# Patient Record
Sex: FEMALE | Race: WHITE | NOT HISPANIC OR LATINO | Employment: OTHER | URBAN - METROPOLITAN AREA
[De-identification: names, ages, dates, MRNs, and addresses within clinical notes are randomized per-mention and may not be internally consistent; named-entity substitution may affect disease eponyms.]

---

## 2021-07-20 ENCOUNTER — TRANSFERRED RECORDS (OUTPATIENT)
Dept: HEALTH INFORMATION MANAGEMENT | Facility: CLINIC | Age: 66
End: 2021-07-20

## 2021-07-20 LAB — POTASSIUM (EXTERNAL): 4.3 MMOL/L (ref 3.5–5)

## 2021-08-09 DIAGNOSIS — Z11.59 ENCOUNTER FOR SCREENING FOR OTHER VIRAL DISEASES: ICD-10-CM

## 2021-08-10 ENCOUNTER — LAB (OUTPATIENT)
Dept: LAB | Facility: CLINIC | Age: 66
End: 2021-08-10
Attending: ORTHOPAEDIC SURGERY
Payer: COMMERCIAL

## 2021-08-10 DIAGNOSIS — Z11.59 ENCOUNTER FOR SCREENING FOR OTHER VIRAL DISEASES: ICD-10-CM

## 2021-08-10 PROCEDURE — U0005 INFEC AGEN DETEC AMPLI PROBE: HCPCS

## 2021-08-10 PROCEDURE — U0003 INFECTIOUS AGENT DETECTION BY NUCLEIC ACID (DNA OR RNA); SEVERE ACUTE RESPIRATORY SYNDROME CORONAVIRUS 2 (SARS-COV-2) (CORONAVIRUS DISEASE [COVID-19]), AMPLIFIED PROBE TECHNIQUE, MAKING USE OF HIGH THROUGHPUT TECHNOLOGIES AS DESCRIBED BY CMS-2020-01-R: HCPCS

## 2021-08-10 RX ORDER — CINNAMON BARK
500 POWDER (GRAM) MISCELLANEOUS DAILY
Status: ON HOLD | COMMUNITY
End: 2024-07-11

## 2021-08-10 RX ORDER — PRAVASTATIN SODIUM 40 MG
40 TABLET ORAL DAILY
COMMUNITY
End: 2024-01-23

## 2021-08-10 RX ORDER — LISINOPRIL 40 MG/1
40 TABLET ORAL AT BEDTIME
COMMUNITY

## 2021-08-10 RX ORDER — LOPERAMIDE HYDROCHLORIDE 2 MG/1
2 TABLET ORAL 2 TIMES DAILY
Status: ON HOLD | COMMUNITY
End: 2024-07-11 | Stop reason: DRUGHIGH

## 2021-08-10 RX ORDER — OXYCODONE HYDROCHLORIDE 5 MG/1
5-10 CAPSULE ORAL EVERY 8 HOURS PRN
Status: ON HOLD | COMMUNITY
End: 2021-08-13

## 2021-08-10 RX ORDER — METOPROLOL TARTRATE 50 MG
100 TABLET ORAL 2 TIMES DAILY
COMMUNITY

## 2021-08-10 RX ORDER — FUROSEMIDE 20 MG
20 TABLET ORAL DAILY
COMMUNITY

## 2021-08-10 RX ORDER — POTASSIUM CHLORIDE 750 MG/1
10 TABLET, EXTENDED RELEASE ORAL DAILY
COMMUNITY
End: 2024-01-23

## 2021-08-10 RX ORDER — MULTIVIT WITH MINERALS/LUTEIN
1 TABLET ORAL DAILY
COMMUNITY

## 2021-08-10 ASSESSMENT — MIFFLIN-ST. JEOR: SCORE: 1463.94

## 2021-08-10 NOTE — PHARMACY-ADMISSION MEDICATION HISTORY
Admission medication history interview status for this patient is complete. See [facility] admission navigator for allergy information, prior to admission medications and immunization status.     PTA meds completed by pre-admitting nurse Mary Ca and reviewed by pharmacy       Prior to Admission medications    Medication Sig Last Dose Taking? Auth Provider   calcium carbonate-vitamin D (OSCAL W/D) 500-200 MG-UNIT tablet Take 1 tablet by mouth daily 7/30/2021 Yes Reported, Patient   Cinnamon Bark POWD Take 500 mg by mouth daily  7/30/2021 Yes Reported, Patient   empagliflozin (JARDIANCE) 10 MG TABS tablet Take 10 mg by mouth daily  Yes Reported, Patient   furosemide (LASIX) 20 MG tablet Take 20 mg by mouth daily  Yes Reported, Patient   lisinopril (ZESTRIL) 40 MG tablet Take 40 mg by mouth daily  Yes Reported, Patient   loperamide (IMODIUM A-D) 2 MG tablet Take 2 mg by mouth 3 times daily as needed for diarrhea  Yes Reported, Patient   metFORMIN (GLUCOPHAGE) 500 MG tablet Take 2,000 mg by mouth daily (with breakfast)  Yes Reported, Patient   metoprolol tartrate (LOPRESSOR) 50 MG tablet Take 75 mg by mouth 2 times daily  Yes Reported, Patient   multivitamin (CENTRUM SILVER) tablet Take 1 tablet by mouth daily 7/30/2021 Yes Reported, Patient   omeprazole (PRILOSEC) 20 MG DR capsule Take 20 mg by mouth daily  Yes Reported, Patient   oxyCODONE (OXY-IR) 5 MG capsule Take 5-10 mg by mouth every 8 hours as needed for severe pain  Yes Reported, Patient   potassium chloride ER (KLOR-CON M) 10 MEQ CR tablet Take 10 mEq by mouth daily  Yes Reported, Patient   pravastatin (PRAVACHOL) 40 MG tablet Take 40 mg by mouth daily  Yes Reported, Patient

## 2021-08-11 LAB — SARS-COV-2 RNA RESP QL NAA+PROBE: NEGATIVE

## 2021-08-11 ASSESSMENT — MIFFLIN-ST. JEOR: SCORE: 1549.56

## 2021-08-13 ENCOUNTER — APPOINTMENT (OUTPATIENT)
Dept: GENERAL RADIOLOGY | Facility: CLINIC | Age: 66
End: 2021-08-13
Attending: ORTHOPAEDIC SURGERY
Payer: COMMERCIAL

## 2021-08-13 ENCOUNTER — DOCUMENTATION ONLY (OUTPATIENT)
Dept: OTHER | Facility: CLINIC | Age: 66
End: 2021-08-13

## 2021-08-13 ENCOUNTER — ANESTHESIA (OUTPATIENT)
Dept: SURGERY | Facility: CLINIC | Age: 66
End: 2021-08-13
Payer: COMMERCIAL

## 2021-08-13 ENCOUNTER — ANESTHESIA EVENT (OUTPATIENT)
Dept: SURGERY | Facility: CLINIC | Age: 66
End: 2021-08-13
Payer: COMMERCIAL

## 2021-08-13 ENCOUNTER — HOSPITAL ENCOUNTER (OUTPATIENT)
Facility: CLINIC | Age: 66
Discharge: HOME | End: 2021-08-14
Attending: ORTHOPAEDIC SURGERY | Admitting: ORTHOPAEDIC SURGERY
Payer: COMMERCIAL

## 2021-08-13 ENCOUNTER — APPOINTMENT (OUTPATIENT)
Dept: PHYSICAL THERAPY | Facility: CLINIC | Age: 66
End: 2021-08-13
Attending: ORTHOPAEDIC SURGERY
Payer: COMMERCIAL

## 2021-08-13 DIAGNOSIS — Z96.659 HISTORY OF TOTAL KNEE REPLACEMENT, UNSPECIFIED LATERALITY: Primary | ICD-10-CM

## 2021-08-13 DIAGNOSIS — M17.12 OSTEOARTHRITIS OF LEFT KNEE: ICD-10-CM

## 2021-08-13 LAB
CREAT SERPL-MCNC: 0.92 MG/DL (ref 0.52–1.04)
GFR SERPL CREATININE-BSD FRML MDRD: 65 ML/MIN/1.73M2
GLUCOSE BLDC GLUCOMTR-MCNC: 118 MG/DL (ref 70–99)
GLUCOSE BLDC GLUCOMTR-MCNC: 127 MG/DL (ref 70–99)
GLUCOSE BLDC GLUCOMTR-MCNC: 160 MG/DL (ref 70–99)
GLUCOSE BLDC GLUCOMTR-MCNC: 184 MG/DL (ref 70–99)
GLUCOSE BLDC GLUCOMTR-MCNC: 190 MG/DL (ref 70–99)

## 2021-08-13 PROCEDURE — 250N000013 HC RX MED GY IP 250 OP 250 PS 637: Performed by: PHYSICIAN ASSISTANT

## 2021-08-13 PROCEDURE — 999N000141 HC STATISTIC PRE-PROCEDURE NURSING ASSESSMENT: Performed by: ORTHOPAEDIC SURGERY

## 2021-08-13 PROCEDURE — 250N000009 HC RX 250: Performed by: NURSE ANESTHETIST, CERTIFIED REGISTERED

## 2021-08-13 PROCEDURE — 258N000003 HC RX IP 258 OP 636: Performed by: ANESTHESIOLOGY

## 2021-08-13 PROCEDURE — 258N000003 HC RX IP 258 OP 636: Performed by: ORTHOPAEDIC SURGERY

## 2021-08-13 PROCEDURE — 999N000065 XR KNEE PORT LEFT 1/2 VIEWS: Mod: LT

## 2021-08-13 PROCEDURE — 99222 1ST HOSP IP/OBS MODERATE 55: CPT | Mod: AI | Performed by: PHYSICIAN ASSISTANT

## 2021-08-13 PROCEDURE — 710N000009 HC RECOVERY PHASE 1, LEVEL 1, PER MIN: Performed by: ORTHOPAEDIC SURGERY

## 2021-08-13 PROCEDURE — 250N000013 HC RX MED GY IP 250 OP 250 PS 637: Performed by: ANESTHESIOLOGY

## 2021-08-13 PROCEDURE — 250N000011 HC RX IP 250 OP 636: Performed by: PHYSICIAN ASSISTANT

## 2021-08-13 PROCEDURE — 250N000011 HC RX IP 250 OP 636: Performed by: ORTHOPAEDIC SURGERY

## 2021-08-13 PROCEDURE — 250N000011 HC RX IP 250 OP 636: Performed by: ANESTHESIOLOGY

## 2021-08-13 PROCEDURE — 272N000001 HC OR GENERAL SUPPLY STERILE: Performed by: ORTHOPAEDIC SURGERY

## 2021-08-13 PROCEDURE — 250N000009 HC RX 250: Performed by: ANESTHESIOLOGY

## 2021-08-13 PROCEDURE — 278N000051 HC OR IMPLANT GENERAL: Performed by: ORTHOPAEDIC SURGERY

## 2021-08-13 PROCEDURE — 97116 GAIT TRAINING THERAPY: CPT | Mod: GP | Performed by: PHYSICAL THERAPIST

## 2021-08-13 PROCEDURE — 360N000077 HC SURGERY LEVEL 4, PER MIN: Performed by: ORTHOPAEDIC SURGERY

## 2021-08-13 PROCEDURE — 258N000001 HC RX 258: Performed by: ORTHOPAEDIC SURGERY

## 2021-08-13 PROCEDURE — 97110 THERAPEUTIC EXERCISES: CPT | Mod: GP | Performed by: PHYSICAL THERAPIST

## 2021-08-13 PROCEDURE — 250N000011 HC RX IP 250 OP 636: Performed by: NURSE ANESTHETIST, CERTIFIED REGISTERED

## 2021-08-13 PROCEDURE — 97530 THERAPEUTIC ACTIVITIES: CPT | Mod: GP | Performed by: PHYSICAL THERAPIST

## 2021-08-13 PROCEDURE — 97161 PT EVAL LOW COMPLEX 20 MIN: CPT | Mod: GP | Performed by: PHYSICAL THERAPIST

## 2021-08-13 PROCEDURE — 250N000013 HC RX MED GY IP 250 OP 250 PS 637: Performed by: ORTHOPAEDIC SURGERY

## 2021-08-13 PROCEDURE — 82565 ASSAY OF CREATININE: CPT | Performed by: ANESTHESIOLOGY

## 2021-08-13 PROCEDURE — 370N000017 HC ANESTHESIA TECHNICAL FEE, PER MIN: Performed by: ORTHOPAEDIC SURGERY

## 2021-08-13 PROCEDURE — 250N000009 HC RX 250: Performed by: ORTHOPAEDIC SURGERY

## 2021-08-13 PROCEDURE — C1776 JOINT DEVICE (IMPLANTABLE): HCPCS | Performed by: ORTHOPAEDIC SURGERY

## 2021-08-13 PROCEDURE — 36415 COLL VENOUS BLD VENIPUNCTURE: CPT | Performed by: ANESTHESIOLOGY

## 2021-08-13 DEVICE — BONE CEMENT SIMPLEX FULL DOSE 6191-1-001: Type: IMPLANTABLE DEVICE | Site: KNEE | Status: FUNCTIONAL

## 2021-08-13 DEVICE — IMPLANTABLE DEVICE
Type: IMPLANTABLE DEVICE | Site: KNEE | Status: FUNCTIONAL
Brand: PERSONA® NATURAL TIBIA®

## 2021-08-13 DEVICE — IMPLANTABLE DEVICE
Type: IMPLANTABLE DEVICE | Site: KNEE | Status: FUNCTIONAL
Brand: PERSONA® VIVACIT-E®

## 2021-08-13 RX ORDER — LIDOCAINE HYDROCHLORIDE 10 MG/ML
INJECTION, SOLUTION INFILTRATION; PERINEURAL PRN
Status: DISCONTINUED | OUTPATIENT
Start: 2021-08-13 | End: 2021-08-13

## 2021-08-13 RX ORDER — NICOTINE POLACRILEX 4 MG
15-30 LOZENGE BUCCAL
Status: DISCONTINUED | OUTPATIENT
Start: 2021-08-13 | End: 2021-08-14 | Stop reason: HOSPADM

## 2021-08-13 RX ORDER — FENTANYL CITRATE 50 UG/ML
INJECTION, SOLUTION INTRAMUSCULAR; INTRAVENOUS PRN
Status: DISCONTINUED | OUTPATIENT
Start: 2021-08-13 | End: 2021-08-13

## 2021-08-13 RX ORDER — METHOCARBAMOL 500 MG/1
500 TABLET, FILM COATED ORAL EVERY 6 HOURS PRN
Status: DISCONTINUED | OUTPATIENT
Start: 2021-08-13 | End: 2021-08-14 | Stop reason: HOSPADM

## 2021-08-13 RX ORDER — OXYCODONE HYDROCHLORIDE 5 MG/1
10 TABLET ORAL EVERY 4 HOURS PRN
Status: DISCONTINUED | OUTPATIENT
Start: 2021-08-13 | End: 2021-08-14 | Stop reason: HOSPADM

## 2021-08-13 RX ORDER — HYDROMORPHONE HCL IN WATER/PF 6 MG/30 ML
0.2 PATIENT CONTROLLED ANALGESIA SYRINGE INTRAVENOUS EVERY 5 MIN PRN
Status: DISCONTINUED | OUTPATIENT
Start: 2021-08-13 | End: 2021-08-13 | Stop reason: HOSPADM

## 2021-08-13 RX ORDER — POTASSIUM CHLORIDE 750 MG/1
10 TABLET, EXTENDED RELEASE ORAL DAILY
Status: DISCONTINUED | OUTPATIENT
Start: 2021-08-13 | End: 2021-08-14 | Stop reason: HOSPADM

## 2021-08-13 RX ORDER — TRANEXAMIC ACID 650 MG/1
1950 TABLET ORAL ONCE
Status: COMPLETED | OUTPATIENT
Start: 2021-08-13 | End: 2021-08-13

## 2021-08-13 RX ORDER — ONDANSETRON 4 MG/1
4 TABLET, ORALLY DISINTEGRATING ORAL EVERY 6 HOURS PRN
Status: DISCONTINUED | OUTPATIENT
Start: 2021-08-13 | End: 2021-08-14 | Stop reason: HOSPADM

## 2021-08-13 RX ORDER — ACETAMINOPHEN 325 MG/1
650 TABLET ORAL EVERY 4 HOURS PRN
Qty: 100 TABLET | Refills: 0 | Status: SHIPPED | OUTPATIENT
Start: 2021-08-13 | End: 2024-01-23

## 2021-08-13 RX ORDER — LABETALOL 20 MG/4 ML (5 MG/ML) INTRAVENOUS SYRINGE
PRN
Status: DISCONTINUED | OUTPATIENT
Start: 2021-08-13 | End: 2021-08-13

## 2021-08-13 RX ORDER — OXYCODONE HYDROCHLORIDE 5 MG/1
5 TABLET ORAL EVERY 4 HOURS PRN
Status: DISCONTINUED | OUTPATIENT
Start: 2021-08-13 | End: 2021-08-14 | Stop reason: HOSPADM

## 2021-08-13 RX ORDER — GABAPENTIN 600 MG/1
1200 TABLET ORAL AT BEDTIME
COMMUNITY

## 2021-08-13 RX ORDER — HYDROXYZINE HYDROCHLORIDE 10 MG/1
10 TABLET, FILM COATED ORAL EVERY 6 HOURS PRN
Qty: 30 TABLET | Refills: 0 | Status: SHIPPED | OUTPATIENT
Start: 2021-08-13 | End: 2021-08-14

## 2021-08-13 RX ORDER — DIPHENHYDRAMINE HCL 25 MG
25 CAPSULE ORAL
Status: DISCONTINUED | OUTPATIENT
Start: 2021-08-13 | End: 2021-08-14 | Stop reason: HOSPADM

## 2021-08-13 RX ORDER — LABETALOL HYDROCHLORIDE 5 MG/ML
10 INJECTION, SOLUTION INTRAVENOUS EVERY 10 MIN PRN
Status: DISCONTINUED | OUTPATIENT
Start: 2021-08-13 | End: 2021-08-13 | Stop reason: HOSPADM

## 2021-08-13 RX ORDER — CEFAZOLIN SODIUM 2 G/100ML
2 INJECTION, SOLUTION INTRAVENOUS EVERY 8 HOURS
Status: COMPLETED | OUTPATIENT
Start: 2021-08-13 | End: 2021-08-14

## 2021-08-13 RX ORDER — ACETAMINOPHEN 325 MG/1
975 TABLET ORAL EVERY 8 HOURS
Status: DISCONTINUED | OUTPATIENT
Start: 2021-08-13 | End: 2021-08-14 | Stop reason: HOSPADM

## 2021-08-13 RX ORDER — FENTANYL CITRATE 50 UG/ML
50 INJECTION, SOLUTION INTRAMUSCULAR; INTRAVENOUS EVERY 5 MIN PRN
Status: DISCONTINUED | OUTPATIENT
Start: 2021-08-13 | End: 2021-08-13 | Stop reason: HOSPADM

## 2021-08-13 RX ORDER — ONDANSETRON 2 MG/ML
INJECTION INTRAMUSCULAR; INTRAVENOUS PRN
Status: DISCONTINUED | OUTPATIENT
Start: 2021-08-13 | End: 2021-08-13

## 2021-08-13 RX ORDER — LIDOCAINE 40 MG/G
CREAM TOPICAL
Status: DISCONTINUED | OUTPATIENT
Start: 2021-08-13 | End: 2021-08-13 | Stop reason: HOSPADM

## 2021-08-13 RX ORDER — GABAPENTIN 300 MG/1
600 CAPSULE ORAL 3 TIMES DAILY
Status: DISCONTINUED | OUTPATIENT
Start: 2021-08-14 | End: 2021-08-13

## 2021-08-13 RX ORDER — ACETAMINOPHEN 325 MG/1
975 TABLET ORAL ONCE
Status: COMPLETED | OUTPATIENT
Start: 2021-08-13 | End: 2021-08-13

## 2021-08-13 RX ORDER — GABAPENTIN 300 MG/1
900 CAPSULE ORAL 3 TIMES DAILY
Status: DISCONTINUED | OUTPATIENT
Start: 2021-08-14 | End: 2021-08-13

## 2021-08-13 RX ORDER — NALOXONE HYDROCHLORIDE 0.4 MG/ML
0.4 INJECTION, SOLUTION INTRAMUSCULAR; INTRAVENOUS; SUBCUTANEOUS
Status: DISCONTINUED | OUTPATIENT
Start: 2021-08-13 | End: 2021-08-14 | Stop reason: HOSPADM

## 2021-08-13 RX ORDER — BISACODYL 10 MG
10 SUPPOSITORY, RECTAL RECTAL DAILY PRN
Status: DISCONTINUED | OUTPATIENT
Start: 2021-08-13 | End: 2021-08-14 | Stop reason: HOSPADM

## 2021-08-13 RX ORDER — HYDROMORPHONE HCL IN WATER/PF 6 MG/30 ML
0.4 PATIENT CONTROLLED ANALGESIA SYRINGE INTRAVENOUS
Status: DISCONTINUED | OUTPATIENT
Start: 2021-08-13 | End: 2021-08-14 | Stop reason: HOSPADM

## 2021-08-13 RX ORDER — PRAVASTATIN SODIUM 20 MG
40 TABLET ORAL DAILY
Status: DISCONTINUED | OUTPATIENT
Start: 2021-08-14 | End: 2021-08-14 | Stop reason: HOSPADM

## 2021-08-13 RX ORDER — ONDANSETRON 2 MG/ML
4 INJECTION INTRAMUSCULAR; INTRAVENOUS EVERY 30 MIN PRN
Status: DISCONTINUED | OUTPATIENT
Start: 2021-08-13 | End: 2021-08-13 | Stop reason: HOSPADM

## 2021-08-13 RX ORDER — ASPIRIN 81 MG/1
81 TABLET, CHEWABLE ORAL DAILY
Status: ON HOLD | COMMUNITY
End: 2021-08-14

## 2021-08-13 RX ORDER — ONDANSETRON 2 MG/ML
4 INJECTION INTRAMUSCULAR; INTRAVENOUS EVERY 6 HOURS PRN
Status: DISCONTINUED | OUTPATIENT
Start: 2021-08-13 | End: 2021-08-14 | Stop reason: HOSPADM

## 2021-08-13 RX ORDER — SODIUM CHLORIDE, SODIUM LACTATE, POTASSIUM CHLORIDE, CALCIUM CHLORIDE 600; 310; 30; 20 MG/100ML; MG/100ML; MG/100ML; MG/100ML
INJECTION, SOLUTION INTRAVENOUS CONTINUOUS
Status: DISCONTINUED | OUTPATIENT
Start: 2021-08-13 | End: 2021-08-13 | Stop reason: HOSPADM

## 2021-08-13 RX ORDER — LIDOCAINE 40 MG/G
CREAM TOPICAL
Status: DISCONTINUED | OUTPATIENT
Start: 2021-08-13 | End: 2021-08-14 | Stop reason: HOSPADM

## 2021-08-13 RX ORDER — GABAPENTIN 300 MG/1
900 CAPSULE ORAL 3 TIMES DAILY
Status: DISCONTINUED | OUTPATIENT
Start: 2021-08-14 | End: 2021-08-14

## 2021-08-13 RX ORDER — HYDROXYZINE HYDROCHLORIDE 10 MG/1
10 TABLET, FILM COATED ORAL EVERY 6 HOURS PRN
Status: DISCONTINUED | OUTPATIENT
Start: 2021-08-13 | End: 2021-08-14 | Stop reason: HOSPADM

## 2021-08-13 RX ORDER — GABAPENTIN 300 MG/1
300 CAPSULE ORAL AT BEDTIME
Status: DISCONTINUED | OUTPATIENT
Start: 2021-08-14 | End: 2021-08-14

## 2021-08-13 RX ORDER — DEXTROSE MONOHYDRATE 25 G/50ML
25-50 INJECTION, SOLUTION INTRAVENOUS
Status: DISCONTINUED | OUTPATIENT
Start: 2021-08-13 | End: 2021-08-14 | Stop reason: HOSPADM

## 2021-08-13 RX ORDER — BUPIVACAINE HYDROCHLORIDE AND EPINEPHRINE 5; 5 MG/ML; UG/ML
INJECTION, SOLUTION PERINEURAL PRN
Status: DISCONTINUED | OUTPATIENT
Start: 2021-08-13 | End: 2021-08-13

## 2021-08-13 RX ORDER — FENTANYL CITRATE 50 UG/ML
50 INJECTION, SOLUTION INTRAMUSCULAR; INTRAVENOUS ONCE
Status: COMPLETED | OUTPATIENT
Start: 2021-08-13 | End: 2021-08-13

## 2021-08-13 RX ORDER — PROPOFOL 10 MG/ML
INJECTION, EMULSION INTRAVENOUS PRN
Status: DISCONTINUED | OUTPATIENT
Start: 2021-08-13 | End: 2021-08-13

## 2021-08-13 RX ORDER — HYDROMORPHONE HCL IN WATER/PF 6 MG/30 ML
0.2 PATIENT CONTROLLED ANALGESIA SYRINGE INTRAVENOUS
Status: DISCONTINUED | OUTPATIENT
Start: 2021-08-13 | End: 2021-08-14 | Stop reason: HOSPADM

## 2021-08-13 RX ORDER — LISINOPRIL 40 MG/1
40 TABLET ORAL DAILY
Status: DISCONTINUED | OUTPATIENT
Start: 2021-08-13 | End: 2021-08-14 | Stop reason: HOSPADM

## 2021-08-13 RX ORDER — ACETAMINOPHEN 325 MG/1
975 TABLET ORAL ONCE
Status: DISCONTINUED | OUTPATIENT
Start: 2021-08-13 | End: 2021-08-13

## 2021-08-13 RX ORDER — PROPOFOL 10 MG/ML
INJECTION, EMULSION INTRAVENOUS CONTINUOUS PRN
Status: DISCONTINUED | OUTPATIENT
Start: 2021-08-13 | End: 2021-08-13

## 2021-08-13 RX ORDER — CEFAZOLIN SODIUM 2 G/100ML
2 INJECTION, SOLUTION INTRAVENOUS
Status: COMPLETED | OUTPATIENT
Start: 2021-08-13 | End: 2021-08-13

## 2021-08-13 RX ORDER — AMOXICILLIN 250 MG
1 CAPSULE ORAL 2 TIMES DAILY
Status: DISCONTINUED | OUTPATIENT
Start: 2021-08-13 | End: 2021-08-14 | Stop reason: HOSPADM

## 2021-08-13 RX ORDER — GABAPENTIN 600 MG/1
600 TABLET ORAL 3 TIMES DAILY
Status: DISCONTINUED | OUTPATIENT
Start: 2021-08-13 | End: 2021-08-13

## 2021-08-13 RX ORDER — NALOXONE HYDROCHLORIDE 0.4 MG/ML
0.2 INJECTION, SOLUTION INTRAMUSCULAR; INTRAVENOUS; SUBCUTANEOUS
Status: DISCONTINUED | OUTPATIENT
Start: 2021-08-13 | End: 2021-08-14 | Stop reason: HOSPADM

## 2021-08-13 RX ORDER — POLYETHYLENE GLYCOL 3350 17 G/17G
17 POWDER, FOR SOLUTION ORAL DAILY
Status: DISCONTINUED | OUTPATIENT
Start: 2021-08-14 | End: 2021-08-14 | Stop reason: HOSPADM

## 2021-08-13 RX ORDER — ACETAMINOPHEN 325 MG/1
650 TABLET ORAL EVERY 4 HOURS PRN
Status: DISCONTINUED | OUTPATIENT
Start: 2021-08-16 | End: 2021-08-14 | Stop reason: HOSPADM

## 2021-08-13 RX ORDER — AMOXICILLIN 250 MG
1-2 CAPSULE ORAL 2 TIMES DAILY
Qty: 30 TABLET | Refills: 0 | Status: SHIPPED | OUTPATIENT
Start: 2021-08-13 | End: 2024-01-23

## 2021-08-13 RX ORDER — METOPROLOL TARTRATE 100 MG
100 TABLET ORAL 2 TIMES DAILY
Status: DISCONTINUED | OUTPATIENT
Start: 2021-08-14 | End: 2021-08-14 | Stop reason: HOSPADM

## 2021-08-13 RX ORDER — OXYCODONE HYDROCHLORIDE 5 MG/1
TABLET ORAL
Qty: 50 TABLET | Refills: 0 | Status: SHIPPED | OUTPATIENT
Start: 2021-08-13 | End: 2024-01-23

## 2021-08-13 RX ORDER — ONDANSETRON 4 MG/1
4 TABLET, ORALLY DISINTEGRATING ORAL EVERY 30 MIN PRN
Status: DISCONTINUED | OUTPATIENT
Start: 2021-08-13 | End: 2021-08-13 | Stop reason: HOSPADM

## 2021-08-13 RX ORDER — IBUPROFEN 400 MG/1
400 TABLET, FILM COATED ORAL EVERY 4 HOURS PRN
Status: DISCONTINUED | OUTPATIENT
Start: 2021-08-13 | End: 2021-08-14 | Stop reason: HOSPADM

## 2021-08-13 RX ORDER — IBUPROFEN 600 MG/1
600 TABLET, FILM COATED ORAL EVERY 6 HOURS PRN
Qty: 30 TABLET | Refills: 0 | Status: SHIPPED | OUTPATIENT
Start: 2021-08-13 | End: 2024-01-23

## 2021-08-13 RX ORDER — ALBUTEROL SULFATE 0.83 MG/ML
2.5 SOLUTION RESPIRATORY (INHALATION) EVERY 4 HOURS PRN
Status: DISCONTINUED | OUTPATIENT
Start: 2021-08-13 | End: 2021-08-13 | Stop reason: HOSPADM

## 2021-08-13 RX ORDER — GABAPENTIN 600 MG/1
900 TABLET ORAL 2 TIMES DAILY
COMMUNITY
End: 2024-07-09

## 2021-08-13 RX ORDER — OXYCODONE HYDROCHLORIDE 5 MG/1
5 TABLET ORAL EVERY 8 HOURS PRN
Status: ON HOLD | COMMUNITY
End: 2021-08-14

## 2021-08-13 RX ORDER — CEFAZOLIN SODIUM 2 G/100ML
2 INJECTION, SOLUTION INTRAVENOUS SEE ADMIN INSTRUCTIONS
Status: DISCONTINUED | OUTPATIENT
Start: 2021-08-13 | End: 2021-08-13 | Stop reason: HOSPADM

## 2021-08-13 RX ORDER — PROCHLORPERAZINE MALEATE 5 MG
5 TABLET ORAL EVERY 6 HOURS PRN
Status: DISCONTINUED | OUTPATIENT
Start: 2021-08-13 | End: 2021-08-14 | Stop reason: HOSPADM

## 2021-08-13 RX ORDER — SODIUM CHLORIDE, SODIUM LACTATE, POTASSIUM CHLORIDE, CALCIUM CHLORIDE 600; 310; 30; 20 MG/100ML; MG/100ML; MG/100ML; MG/100ML
INJECTION, SOLUTION INTRAVENOUS CONTINUOUS
Status: DISCONTINUED | OUTPATIENT
Start: 2021-08-13 | End: 2021-08-14 | Stop reason: HOSPADM

## 2021-08-13 RX ORDER — NALOXONE HYDROCHLORIDE 0.4 MG/ML
INJECTION, SOLUTION INTRAMUSCULAR; INTRAVENOUS; SUBCUTANEOUS PRN
Status: DISCONTINUED | OUTPATIENT
Start: 2021-08-13 | End: 2021-08-13

## 2021-08-13 RX ADMIN — METHOCARBAMOL 500 MG: 500 TABLET ORAL at 13:54

## 2021-08-13 RX ADMIN — TRANEXAMIC ACID 1950 MG: 650 TABLET ORAL at 07:52

## 2021-08-13 RX ADMIN — CEFAZOLIN SODIUM 2 G: 2 INJECTION, SOLUTION INTRAVENOUS at 09:13

## 2021-08-13 RX ADMIN — PROPOFOL 75 MCG/KG/MIN: 10 INJECTION, EMULSION INTRAVENOUS at 09:24

## 2021-08-13 RX ADMIN — FENTANYL CITRATE 50 MCG: 50 INJECTION, SOLUTION INTRAMUSCULAR; INTRAVENOUS at 11:21

## 2021-08-13 RX ADMIN — LABETALOL HYDROCHLORIDE 10 MG: 5 INJECTION, SOLUTION INTRAVENOUS at 11:42

## 2021-08-13 RX ADMIN — GABAPENTIN 600 MG: 600 TABLET, FILM COATED ORAL at 21:04

## 2021-08-13 RX ADMIN — NALOXONE HYDROCHLORIDE 0.04 MG: 0.4 INJECTION, SOLUTION INTRAMUSCULAR; INTRAVENOUS; SUBCUTANEOUS at 10:51

## 2021-08-13 RX ADMIN — METHOCARBAMOL 500 MG: 500 TABLET ORAL at 22:39

## 2021-08-13 RX ADMIN — BUPIVACAINE HYDROCHLORIDE AND EPINEPHRINE BITARTRATE 20 ML: 5; .005 INJECTION, SOLUTION EPIDURAL; INTRACAUDAL; PERINEURAL at 08:42

## 2021-08-13 RX ADMIN — LABETALOL HYDROCHLORIDE 10 MG: 5 INJECTION, SOLUTION INTRAVENOUS at 11:13

## 2021-08-13 RX ADMIN — OXYCODONE HYDROCHLORIDE 5 MG: 5 TABLET ORAL at 21:17

## 2021-08-13 RX ADMIN — OXYCODONE HYDROCHLORIDE 10 MG: 5 TABLET ORAL at 17:05

## 2021-08-13 RX ADMIN — POTASSIUM CHLORIDE 10 MEQ: 750 TABLET, FILM COATED, EXTENDED RELEASE ORAL at 19:03

## 2021-08-13 RX ADMIN — FENTANYL CITRATE 50 MCG: 50 INJECTION, SOLUTION INTRAMUSCULAR; INTRAVENOUS at 09:10

## 2021-08-13 RX ADMIN — IBUPROFEN 400 MG: 400 TABLET, FILM COATED ORAL at 15:03

## 2021-08-13 RX ADMIN — HYDROMORPHONE HYDROCHLORIDE 0.2 MG: 0.2 INJECTION, SOLUTION INTRAMUSCULAR; INTRAVENOUS; SUBCUTANEOUS at 11:58

## 2021-08-13 RX ADMIN — PROPOFOL 200 MG: 10 INJECTION, EMULSION INTRAVENOUS at 09:10

## 2021-08-13 RX ADMIN — LIDOCAINE HYDROCHLORIDE 50 MG: 10 INJECTION, SOLUTION INFILTRATION; PERINEURAL at 09:10

## 2021-08-13 RX ADMIN — FENTANYL CITRATE 50 MCG: 50 INJECTION, SOLUTION INTRAMUSCULAR; INTRAVENOUS at 11:30

## 2021-08-13 RX ADMIN — ACETAMINOPHEN 975 MG: 325 TABLET, FILM COATED ORAL at 16:16

## 2021-08-13 RX ADMIN — LISINOPRIL 40 MG: 40 TABLET ORAL at 21:04

## 2021-08-13 RX ADMIN — LABETALOL 20 MG/4 ML (5 MG/ML) INTRAVENOUS SYRINGE 10 MG: at 09:35

## 2021-08-13 RX ADMIN — ASPIRIN 325 MG: 325 TABLET, COATED ORAL at 13:57

## 2021-08-13 RX ADMIN — FENTANYL CITRATE 50 MCG: 50 INJECTION, SOLUTION INTRAMUSCULAR; INTRAVENOUS at 08:37

## 2021-08-13 RX ADMIN — ONDANSETRON HYDROCHLORIDE 4 MG: 2 INJECTION, SOLUTION INTRAVENOUS at 10:31

## 2021-08-13 RX ADMIN — METOPROLOL TARTRATE 75 MG: 50 TABLET, FILM COATED ORAL at 19:03

## 2021-08-13 RX ADMIN — OXYCODONE HYDROCHLORIDE 10 MG: 5 TABLET ORAL at 11:57

## 2021-08-13 RX ADMIN — SODIUM CHLORIDE, POTASSIUM CHLORIDE, SODIUM LACTATE AND CALCIUM CHLORIDE: 600; 310; 30; 20 INJECTION, SOLUTION INTRAVENOUS at 21:16

## 2021-08-13 RX ADMIN — SODIUM CHLORIDE, POTASSIUM CHLORIDE, SODIUM LACTATE AND CALCIUM CHLORIDE: 600; 310; 30; 20 INJECTION, SOLUTION INTRAVENOUS at 10:54

## 2021-08-13 RX ADMIN — CEFAZOLIN SODIUM 2 G: 2 INJECTION, SOLUTION INTRAVENOUS at 16:17

## 2021-08-13 RX ADMIN — HYDROMORPHONE HYDROCHLORIDE 1 MG: 1 INJECTION, SOLUTION INTRAMUSCULAR; INTRAVENOUS; SUBCUTANEOUS at 09:25

## 2021-08-13 RX ADMIN — GABAPENTIN 600 MG: 600 TABLET, FILM COATED ORAL at 16:16

## 2021-08-13 RX ADMIN — SODIUM CHLORIDE, POTASSIUM CHLORIDE, SODIUM LACTATE AND CALCIUM CHLORIDE: 600; 310; 30; 20 INJECTION, SOLUTION INTRAVENOUS at 07:59

## 2021-08-13 RX ADMIN — ACETAMINOPHEN 975 MG: 325 TABLET, FILM COATED ORAL at 07:51

## 2021-08-13 ASSESSMENT — COPD QUESTIONNAIRES: COPD: 0

## 2021-08-13 ASSESSMENT — ENCOUNTER SYMPTOMS: SEIZURES: 0

## 2021-08-13 ASSESSMENT — MIFFLIN-ST. JEOR: SCORE: 1548.31

## 2021-08-13 NOTE — CONSULTS
Hospitalist Consultation      Germán Villanueva MRN# [id number]   YOB: 1955 Age: 65 year old   Date of Admission: 2021     Requesting Physician:  Dr. Thomas  Reason for consult: Medical management           Assessment and Plan:   This patient is a 65 year old female with a PMH significant for DM II, HTN, BRONWYN, HLP and GERD who is POD 0 s/p left total knee arthroplasty for osteoarthritis.     # S/p  left total knee arthroplasty for osteoarthritis  Overall doing well.   -cont PT/OT  -pain control as per primary    #DM II  Last A1c was 7.3  -Hold Jardiance  -Continue Metformin  -ISS ordered    #HTN  -Continue Metoprolol and Lisinopril on parameters  -Hold Lasix    #HLP  -Continue Pravastatin    #GERD  -Continue Omeprazole      DVT Prophylaxis: on Aspirin as per primary  D/C planning: To home once cleared by surgery              History of Present Illness:   This patient is a 65 year old female who is POD 1 s/p  left total knee arthroplasty for osteoarthritis  Intra-op report reviewed and showed no intra-op complications. She feels tired but no other complaints right now. Pain is adequately controlled. No difficulty with breathing. Has not been ill recently.                   Past Medical History:     Past Medical History:   Diagnosis Date     Arthritis     generalized     Diabetes (H)      Gastroesophageal reflux disease      Hypertension      Other chronic pain     neck, back, right hip, left knee     Sleep apnea     cpap               Past Surgical History:     Past Surgical History:   Procedure Laterality Date     BACK SURGERY      L4-5 fusion     HEAD & NECK SURGERY      cervical 5 vertebrae fusion                 Social History:     Social History     Tobacco Use     Smoking status: Former Smoker     Types: Cigarettes     Quit date: 2001     Years since quittin.5     Smokeless tobacco: Never Used   Substance Use Topics     Alcohol use: Yes     Comment: 3 glasses wine/week      Drug use: Never                 Family History:     Family Hx fully reviewed and is non contributory to this admission.             Allergies:     Allergies   Allergen Reactions     Nickel Rash             Medications:     Prior to Admission medications    Medication Sig Last Dose Taking? Auth Provider   acetaminophen (TYLENOL) 325 MG tablet Take 2 tablets (650 mg) by mouth every 4 hours as needed for other (mild pain)  Yes Phillip Dozier MD   aspirin (ASA) 325 MG EC tablet Take 1 tablet (325 mg) by mouth daily  Yes Phillip Dozier MD   aspirin (ASA) 81 MG chewable tablet Take 81 mg by mouth daily 8/6/2021 Yes Reported, Patient   calcium carbonate-vitamin D (OSCAL W/D) 500-200 MG-UNIT tablet Take 1 tablet by mouth daily 8/12/2021 at Unknown time Yes Reported, Patient   Cinnamon Bark POWD Take 500 mg by mouth daily  8/13/2021 at Unknown time Yes Reported, Patient   empagliflozin (JARDIANCE) 10 MG TABS tablet Take 10 mg by mouth daily 8/12/2021 at Unknown time Yes Reported, Patient   furosemide (LASIX) 20 MG tablet Take 20 mg by mouth daily Past Week at Unknown time Yes Reported, Patient   gabapentin (NEURONTIN) 600 MG tablet Take 600 mg by mouth 3 times daily 8/13/2021 at Unknown time Yes Reported, Patient   hydrOXYzine (ATARAX) 10 MG tablet Take 1 tablet (10 mg) by mouth every 6 hours as needed for itching or anxiety (with pain, moderate pain)  Yes Phillip Dozier MD   ibuprofen (ADVIL/MOTRIN) 600 MG tablet Take 1 tablet (600 mg) by mouth every 6 hours as needed for pain (mild)  Yes Phillip Dozier MD   lisinopril (ZESTRIL) 40 MG tablet Take 40 mg by mouth daily 8/12/2021 at Unknown time Yes Reported, Patient   loperamide (IMODIUM A-D) 2 MG tablet Take 2 mg by mouth 3 times daily as needed for diarrhea 8/13/2021 at Unknown time Yes Reported, Patient   metFORMIN (GLUCOPHAGE) 500 MG tablet Take 2,000 mg by mouth daily (with breakfast) 8/12/2021 at Unknown time Yes Reported, Patient  "  metoprolol tartrate (LOPRESSOR) 50 MG tablet Take 75 mg by mouth 2 times daily 8/13/2021 at Unknown time Yes Reported, Patient   multivitamin (CENTRUM SILVER) tablet Take 1 tablet by mouth daily 8/12/2021 at Unknown time Yes Reported, Patient   omeprazole (PRILOSEC) 20 MG DR capsule Take 20 mg by mouth daily 8/13/2021 at Unknown time Yes Reported, Patient   oxyCODONE (ROXICODONE) 5 MG tablet 1 tab po q 4-6 hrs prn pain 3-6 on pain scale  2 tabs po q 4-6hrs prn pain 7-10 on pain scale  Yes Phillip Dozier MD   potassium chloride ER (KLOR-CON M) 10 MEQ CR tablet Take 10 mEq by mouth daily 8/12/2021 at Unknown time Yes Reported, Patient   pravastatin (PRAVACHOL) 40 MG tablet Take 40 mg by mouth daily 8/13/2021 at Unknown time Yes Reported, Patient   senna-docusate (SENOKOT-S/PERICOLACE) 8.6-50 MG tablet Take 1-2 tablets by mouth 2 times daily Take while on oral narcotics to prevent or treat constipation.  Yes Phillip Dozier MD               Review of Systems:     A comprehensive greater than 10 system review of systems was carried out.  Pertinent positives and negatives are noted above.  Otherwise negative for contributory info.            Physical Exam:   Vitals were reviewed  Blood pressure (!) 151/79, pulse 79, temperature 97.5  F (36.4  C), temperature source Temporal, resp. rate 12, height 1.676 m (5' 6\"), weight 99.2 kg (218 lb 9.6 oz), SpO2 96 %, not currently breastfeeding.  Exam:    GENERAL:  Comfortable.  PSYCH: pleasant, oriented, No acute distress.  HEENT:  PERRLA. Normal conjunctiva, normal hearing, nasal mucosa and Oropharynx are normal.  NECK:  Supple, no neck vein distention, adenopathy or bruits, normal thyroid.  HEART:  Normal S1, S2 with no murmur, no pericardial rub, S3 or S4.  LUNGS:  Clear to auscultation, normal Respiratory effort.  ABDOMEN:  Soft, no hepatosplenomegaly, normal bowel sounds.  EXTREMITIES:  No pedal edema, +2 pulses bilateral and equal.  SKIN:  Dry to touch, No " rash, wound or ulcerations.  NEUROLOGIC:  Nonfocal with normal cranial nerve and motor power and sensation.            Data:   Past 24 hours labs, studies, and imaging were reviewed.  No results for input(s): WBC, HGB, HCT, MCV, PLT in the last 168 hours.  Recent Labs   Lab 08/13/21  1101 08/13/21  0756 08/13/21  0742   * 127*  --    CR  --   --  0.92   GFRESTIMATED  --   --  65       Ciara Thao PA-C    Pt discussed with Dr. Valdes who agrees with the care as discussed above.

## 2021-08-13 NOTE — ANESTHESIA POSTPROCEDURE EVALUATION
Patient: Germán Villanueva    Procedure(s):  Left total knee arthroplasty    Diagnosis:Osteoarthritis of left knee [M17.12]  Diagnosis Additional Information: No value filed.    Anesthesia Type:  General    Note:  Disposition: Inpatient   Postop Pain Control: Uneventful            Sign Out: Well controlled pain   PONV: No   Neuro/Psych: Uneventful            Sign Out: Acceptable/Baseline neuro status   Airway/Respiratory: Uneventful            Sign Out: Acceptable/Baseline resp. status   CV/Hemodynamics: Uneventful            Sign Out: Acceptable CV status; No obvious hypovolemia; No obvious fluid overload   Other NRE: NONE   DID A NON-ROUTINE EVENT OCCUR? No           Last vitals:  Vitals Value Taken Time   /93 08/13/21 1230   Temp 97.1  F (36.2  C) 08/13/21 1210   Pulse 79 08/13/21 1234   Resp 10 08/13/21 1234   SpO2 94 % 08/13/21 1234   Vitals shown include unvalidated device data.    Electronically Signed By: Rebekah Houston MD  August 13, 2021  3:10 PM

## 2021-08-13 NOTE — ANESTHESIA CARE TRANSFER NOTE
Patient: Germán Villanueva    Procedure(s):  Left total knee arthroplasty    Diagnosis: Osteoarthritis of left knee [M17.12]  Diagnosis Additional Information: No value filed.    Anesthesia Type:   General     Note:    Oropharynx: oropharynx clear of all foreign objects  Level of Consciousness: drowsy  Oxygen Supplementation: face mask  Level of Supplemental Oxygen (L/min / FiO2): 6 lpm  Independent Airway: airway patency satisfactory and stable  Dentition: dentition unchanged  Vital Signs Stable: post-procedure vital signs reviewed and stable  Report to RN Given: handoff report given  Patient transferred to: PACU  Comments: Patient with spontaneous respirations and adequate tidal volumes. Patient awake and responsive. LMA removed in OR to 6L facemask. To PACU ventilating well. VSS. Report given.    Handoff Report: Identifed the Patient, Identified the Reponsible Provider, Reviewed the pertinent medical history, Discussed the surgical course, Reviewed Intra-OP anesthesia mangement and issues during anesthesia, Set expectations for post-procedure period and Allowed opportunity for questions and acknowledgement of understanding      Vitals:  Vitals Value Taken Time   /106 08/13/21 1056   Temp     Pulse 88 08/13/21 1100   Resp 9 08/13/21 1100   SpO2 97 % 08/13/21 1100   Vitals shown include unvalidated device data.    Electronically Signed By: PARISH Hernandes CRNA  August 13, 2021  11:01 AM

## 2021-08-13 NOTE — BRIEF OP NOTE
TKR for Pre/Post OA knee  Jacoby  TT est 60 w EBL min (see dictation for full)  Spec none  No anticipated complications

## 2021-08-13 NOTE — OP NOTE
Procedure Date: 08/13/2021    PREOPERATIVE DIAGNOSES:    1.  Osteoarthritis, left knee.  2.  Nickel allergy.    POSTOPERATIVE DIAGNOSES:    1.  Osteoarthritis, left knee.  2.  Nickel allergy.    PROCEDURE PERFORMED:  Left total knee arthroplasty.    SURGEON:  Phillip Dozier MD    ASSISTANT:  Savannah Amezcua PA-C    ANESTHESIA:  General with adductor block.    ESTIMATED BLOOD LOSS:  25 mL    TOURNIQUET TIME:  Approximately 70 minutes.    COMPLICATIONS:  None.    DESCRIPTION OF PROCEDURE:  The patient was taken to the operating room where after successful administration of general anesthetic, tranexamic acid, antibiotic prophylaxis, adductor block and sterile prep and drape, the leg was exsanguinated and an anterior incision was made followed by a medial arthrotomy with a small posteromedial joint line release.  An intramedullary 5-degree guide was used with 3 degrees of external rotation, which did match the epicondylar axis and a box cut was made for PCL substitution.  Retractors were carefully placed about the proximal tibia and a cut was made perpendicular to the mechanical axis of the tibia, removing 2, followed by an additional 2 mm of bone.  Posterior osteophytes were removed under direct vision and with the knee in hyperflexion, and a capsular injection was performed under direct vision and protecting the neurovascular structures.  The tibia was prepared at the junction of the medial and middle thirds of the tubercle and 9 mm resected from the undersurface of a 22 mm patella and sized appropriately.  Gaps were equal.  Of note, there were broad grade 4 chondromalacia changes of the medial femoral condyle with a meniscal root tear.  The other 2 compartments were relatively pristine, but I elected not to do a unicompartmental arthroplasty, both due to osteopenia and also due to her reported nickel allergy, as she described hives preoperatively.    After copious lavage and drying, Simplex cementing was  performed for a Karey Persona Tivanium nickel-free size 9 femur cruciate substituting, size E tibia, 12 mm vitamin E polyethylene insert, and a 32 mm patellar button.  Range of motion, balance and tracking were all excellent.  Copious saline and Betadine irrigation were used followed by a layered anatomic closure.  There were no complications.    Phillip Dozier MD        D: 2021   T: 2021   MT: JOSER AMON    Name:     SHWETA CHAVARRIA  MRN:      [id number]        Account:        [id number]   :      1955           Procedure Date: 2021     Document: [id number]

## 2021-08-13 NOTE — ANESTHESIA PROCEDURE NOTES
Adductor canal Procedure Note  Pre-Procedure   Staff -        Anesthesiologist:  Rebekah Houston MD       Performed By: anesthesiologist       Referred By: Jacoby       Location: pre-op       Pre-Anesthestic Checklist: patient identified, IV checked, site marked, risks and benefits discussed, informed consent, monitors and equipment checked, pre-op evaluation, at physician/surgeon's request and post-op pain management  Timeout:       Correct Patient: Yes        Correct Procedure: Yes        Correct Site: Yes        Correct Position: Yes        Correct Laterality: Yes        Site Marked: Yes  Procedure Documentation  Procedure: Adductor canal       Laterality: left       Patient Position: supine       Skin prep: Betadine       Local skin infiltrated with 2 mL of 1% lidocaine.        Needle Gauge: 21.        Needle Length (Inches): 4        Ultrasound guided       1. Ultrasound was used to identify targeted nerve, plexus, vascular marker, or fascial plane and place a needle adjacent to it in real-time.       2. Ultrasound was used to visualize the spread of anesthetic in close proximity to the above referenced structure.       4. The visualized anatomic structures appeared normal.       5. There were no apparent abnormal pathologic findings.    Assessment/Narrative         The placement was negative for: blood aspirated, painful injection and site bleeding       Paresthesias: No.     Bolus given via. No blood aspirated via catheter.        Secured via.        Insertion/Infusion Method: Single Shot

## 2021-08-13 NOTE — ANESTHESIA PROCEDURE NOTES
Airway       Patient location during procedure: OR       Procedure Start/Stop Times: 8/13/2021 9:12 AM  Staff -        Anesthesiologist:  Rebekah Houston MD       CRNA: Vignesh Coombs APRN CRNA       Performed By: CRNA  Consent for Airway        Urgency: elective  Indications and Patient Condition       Indications for airway management: simran-procedural       Induction type:intravenous       Mask difficulty assessment: 1 - vent by mask    Final Airway Details       Final airway type: supraglottic airway    Supraglottic Airway Details        Type: LMA       Brand: I-Gel       LMA size: 4    Post intubation assessment        Placement verified by: capnometry, equal breath sounds and chest rise        Number of attempts at approach: 1       Number of other approaches attempted: 0       Secured with: plastic tape       Ease of procedure: easy       Dentition: Intact and Unchanged

## 2021-08-13 NOTE — PROGRESS NOTES
08/13/21 1500   Quick Adds   Type of Visit Initial PT Evaluation   Living Environment   People in home spouse;child(osmin), adult   Current Living Arrangements house   Home Accessibility stairs to enter home;stairs within home   Number of Stairs, Main Entrance 3   Stair Railings, Main Entrance railings safe and in good condition   Number of Stairs, Within Home, Primary other (see comments)   Stair Railings, Within Home, Primary railings safe and in good condition   Living Environment Comments Pt lives in 1 level with basement apt   Self-Care   Equipment Currently Used at Home cane, straight   Disability/Function   Fall history within last six months yes   Number of times patient has fallen within last six months 1   General Information   Onset of Illness/Injury or Date of Surgery 08/13/21   Referring Physician Dr. Dozier   Patient/Family Therapy Goals Statement (PT) Pt hoping to DC home.    Pertinent History of Current Problem (include personal factors and/or comorbidities that impact the POC) Pt is status post L TKA   Weight-Bearing Status - LLE weight-bearing as tolerated   Cognition   Orientation Status (Cognition) oriented x 3   Pain Assessment   Patient Currently in Pain Yes, see Vital Sign flowsheet   Range of Motion (ROM)   ROM Quick Adds ROM deficits secondary to surgical procedure   Strength   Manual Muscle Testing Quick Adds Deficits observed during functional mobility   Strength Comments limited L SLR   Bed Mobility   Comment (Bed Mobility) SBA   Transfers   Transfer Safety Comments CGA x 2 intially due to concern over SLR- blocked the L knee intially   Gait/Stairs (Locomotion)   Comment (Gait/Stairs) 80 feet, use of FWW ,pt wanting to attempt SEC, but poor tolerance due to pain. CGA   Balance   Balance Comments 1 LOB with retrowalk in BR   Clinical Impression   Criteria for Skilled Therapeutic Intervention yes, treatment indicated   PT Diagnosis (PT) decreased functional mobility   Influenced by the  following impairments decreased ROM, decreased strength, pain   Functional limitations due to impairments decreased bed mob, transfers, ambulation, stairs   Clinical Presentation Stable/Uncomplicated   Clinical Presentation Rationale improving   Clinical Decision Making (Complexity) low complexity   Therapy Frequency (PT) Daily   Predicted Duration of Therapy Intervention (days/wks) 2 days   Planned Therapy Interventions (PT) balance training;bed mobility training;gait training;home exercise program;strengthening;stair training;transfer training;risk factor education;home program guidelines   Anticipated Equipment Needs at Discharge (PT) walker, rolling   Risk & Benefits of therapy have been explained evaluation/treatment results reviewed;care plan/treatment goals reviewed;risks/benefits reviewed;current/potential barriers reviewed;participants voiced agreement with care plan;participants included;patient   PT Discharge Planning    PT Discharge Recommendation (DC Rec)   (defer to ortho)   PT Rationale for DC Rec Pt will need min A for stairs, walker for mobilizing safety, but mod I. SBA/Mod I for transfers, bed mob   PT Brief overview of current status  Pt able to toelrate 80 feet of ambulatio with SEC and walker. CGA   Total Evaluation Time   Total Evaluation Time (Minutes) 5

## 2021-08-13 NOTE — PROGRESS NOTES
Patient up to floor from surgery around 1300    Patient vital signs are at baseline: Yes  Patient able to ambulate as they were prior to admission or with assist devices provided by therapies during their stay:  No,  Reason:  Therapy at 1515 today  Patient MUST void prior to discharge:  No,  Reason:  DTV  Patient able to tolerate oral intake:  Yes  Pain has adequate pain control using Oral analgesics:  Yes     Dressing CDI, ace wrap in place  Denies numbness/tingling, but sensation feels off  Extremity warm, no discoloration, pedal pulses good  Peripheral IV infusing IVF  Hypoactive BS, last BM yesterday- diarrhea  BRONWYN- patient has home CPAP

## 2021-08-13 NOTE — ANESTHESIA PREPROCEDURE EVALUATION
Anesthesia Pre-Procedure Evaluation    Patient: Germán Villanueva   MRN: [id number] : 1955        Preoperative Diagnosis: Osteoarthritis of left knee [M17.12]   Procedure : Procedure(s):  left total knee arthroplasty     Past Medical History:   Diagnosis Date     Arthritis     generalized     Diabetes (H)      Gastroesophageal reflux disease      Hypertension      Other chronic pain     neck, back, right hip, left knee     Sleep apnea     cpap      Past Surgical History:   Procedure Laterality Date     BACK SURGERY      L4-5 fusion     HEAD & NECK SURGERY      cervical 5 vertebrae fusion      Allergies   Allergen Reactions     Nickel Rash      Social History     Tobacco Use     Smoking status: Former Smoker     Types: Cigarettes     Quit date: 2001     Years since quittin.5     Smokeless tobacco: Never Used   Substance Use Topics     Alcohol use: Yes     Comment: 3 glasses wine/week      Wt Readings from Last 1 Encounters:   21 99.2 kg (218 lb 9.6 oz)        Anesthesia Evaluation            ROS/MED HX  ENT/Pulmonary:     (+) sleep apnea, uses CPAP,  (-) asthma, COPD and recent URI   Neurologic:  - neg neurologic ROS  (-) no seizures and no CVA   Cardiovascular:     (+) hypertension----- (-) stent   METS/Exercise Tolerance:     Hematologic:       Musculoskeletal: Comment: L4-S1 fusion  (+) arthritis,     GI/Hepatic:     (+) GERD, Asymptomatic on medication,     Renal/Genitourinary:  - neg Renal ROS     Endo:     (+) type II DM, Not using insulin,     Psychiatric/Substance Use:     (+) H/O chronic opiod use .     Infectious Disease:       Malignancy:       Other:      (+) , H/O Chronic Pain,        Physical Exam    Airway        Mallampati: II   TM distance: < 3 FB   Neck ROM: limited   Mouth opening: < 3 cm    Respiratory Devices and Support         Dental  no notable dental history         Cardiovascular   cardiovascular exam normal          Pulmonary   pulmonary exam normal                 OUTSIDE LABS:  CBC:   Lab Results   Component Value Date    HGB 10.8 (L) 07/10/2010    HGB 11.5 (L) 07/09/2010     BMP:   Lab Results   Component Value Date     07/07/2010    POTASSIUM 4.0 07/07/2010    CHLORIDE 104 07/07/2010    CO2 27 07/07/2010    BUN 20 07/07/2010    CR 0.66 07/07/2010     (H) 08/13/2021     (H) 07/07/2010     COAGS:   Lab Results   Component Value Date    PTT 30 07/07/2010    INR 0.89 07/07/2010     POC:   Lab Results   Component Value Date     (H) 07/08/2010     HEPATIC: No results found for: ALBUMIN, PROTTOTAL, ALT, AST, GGT, ALKPHOS, BILITOTAL, BILIDIRECT, GINGER  OTHER:   Lab Results   Component Value Date    SCOUT 8.9 07/07/2010       Anesthesia Plan    ASA Status:  2   NPO Status:  NPO Appropriate    Anesthesia Type: General.     - Airway: LMA   Induction: Intravenous.   Maintenance: Balanced.        Consents    Anesthesia Plan(s) and associated risks, benefits, and realistic alternatives discussed. Questions answered and patient/representative(s) expressed understanding.     - Discussed with:  Patient      - Extended Intubation/Ventilatory Support Discussed: Yes.      - Patient is DNR/DNI Status: No         Postoperative Care    Pain management: IV analgesics, Oral pain medications, Peripheral nerve block (Single Shot), Multi-modal analgesia.   PONV prophylaxis: Ondansetron (or other 5HT-3), Dexamethasone or Solumedrol     Comments:    The surgeon has given a verbal order transferring care of this patient to me for the performance of a regional analgesia block for post-op pain control. It is requested of me because I am uniquely trained and qualified to perform this block and the surgeon is neither trained nor qualified to perform this procedure.    Ultrasound guided peripheral nerve block(s) discussed. The patient was informed that undergoing the block is not required for surgery to proceed and is optional, but they can be beneficial in reducing post  operative pain medication requirements for a period of time (several hours to a few days). Block rationale, procedure, expected results, and block specific side effects reviewed with the patient. Risks, including but not limited to bleeding, infection, nerve damage/damage to surrounding structures, medication adverse/allergic reactions, and block failure discussed.  Questions encouraged and answered.  The patient wishes to proceed.                 Rebekah Houston MD

## 2021-08-14 ENCOUNTER — APPOINTMENT (OUTPATIENT)
Dept: PHYSICAL THERAPY | Facility: CLINIC | Age: 66
End: 2021-08-14
Attending: ORTHOPAEDIC SURGERY
Payer: COMMERCIAL

## 2021-08-14 ENCOUNTER — APPOINTMENT (OUTPATIENT)
Dept: OCCUPATIONAL THERAPY | Facility: CLINIC | Age: 66
End: 2021-08-14
Attending: ORTHOPAEDIC SURGERY
Payer: COMMERCIAL

## 2021-08-14 VITALS
HEIGHT: 66 IN | WEIGHT: 218.6 LBS | HEART RATE: 86 BPM | BODY MASS INDEX: 35.13 KG/M2 | RESPIRATION RATE: 19 BRPM | OXYGEN SATURATION: 96 % | TEMPERATURE: 98.2 F | DIASTOLIC BLOOD PRESSURE: 65 MMHG | SYSTOLIC BLOOD PRESSURE: 145 MMHG

## 2021-08-14 LAB
GLUCOSE BLDC GLUCOMTR-MCNC: 154 MG/DL (ref 70–99)
GLUCOSE BLDC GLUCOMTR-MCNC: 165 MG/DL (ref 70–99)
GLUCOSE BLDC GLUCOMTR-MCNC: 175 MG/DL (ref 70–99)
HGB BLD-MCNC: 11.6 G/DL (ref 11.7–15.7)

## 2021-08-14 PROCEDURE — 250N000011 HC RX IP 250 OP 636: Performed by: ORTHOPAEDIC SURGERY

## 2021-08-14 PROCEDURE — 97530 THERAPEUTIC ACTIVITIES: CPT | Mod: GP | Performed by: PHYSICAL THERAPIST

## 2021-08-14 PROCEDURE — 250N000013 HC RX MED GY IP 250 OP 250 PS 637: Performed by: INTERNAL MEDICINE

## 2021-08-14 PROCEDURE — 36415 COLL VENOUS BLD VENIPUNCTURE: CPT | Performed by: ORTHOPAEDIC SURGERY

## 2021-08-14 PROCEDURE — 250N000013 HC RX MED GY IP 250 OP 250 PS 637: Performed by: PHYSICIAN ASSISTANT

## 2021-08-14 PROCEDURE — 99232 SBSQ HOSP IP/OBS MODERATE 35: CPT | Performed by: INTERNAL MEDICINE

## 2021-08-14 PROCEDURE — 97165 OT EVAL LOW COMPLEX 30 MIN: CPT | Mod: GO

## 2021-08-14 PROCEDURE — 85018 HEMOGLOBIN: CPT | Performed by: ORTHOPAEDIC SURGERY

## 2021-08-14 PROCEDURE — 97116 GAIT TRAINING THERAPY: CPT | Mod: GP | Performed by: PHYSICAL THERAPIST

## 2021-08-14 PROCEDURE — 250N000013 HC RX MED GY IP 250 OP 250 PS 637: Performed by: ORTHOPAEDIC SURGERY

## 2021-08-14 PROCEDURE — 97535 SELF CARE MNGMENT TRAINING: CPT | Mod: GO

## 2021-08-14 RX ORDER — GABAPENTIN 300 MG/1
300 CAPSULE ORAL ONCE
Status: COMPLETED | OUTPATIENT
Start: 2021-08-14 | End: 2021-08-14

## 2021-08-14 RX ORDER — GABAPENTIN 300 MG/1
900 CAPSULE ORAL 2 TIMES DAILY WITH MEALS
Status: DISCONTINUED | OUTPATIENT
Start: 2021-08-14 | End: 2021-08-14 | Stop reason: HOSPADM

## 2021-08-14 RX ORDER — GABAPENTIN 400 MG/1
1200 CAPSULE ORAL AT BEDTIME
Status: DISCONTINUED | OUTPATIENT
Start: 2021-08-14 | End: 2021-08-14 | Stop reason: HOSPADM

## 2021-08-14 RX ORDER — METHOCARBAMOL 500 MG/1
500 TABLET, FILM COATED ORAL EVERY 6 HOURS PRN
Qty: 15 TABLET | Refills: 0 | Status: SHIPPED | OUTPATIENT
Start: 2021-08-14 | End: 2024-01-23

## 2021-08-14 RX ADMIN — ACETAMINOPHEN 975 MG: 325 TABLET, FILM COATED ORAL at 00:21

## 2021-08-14 RX ADMIN — METFORMIN HYDROCHLORIDE 1000 MG: 500 TABLET, FILM COATED ORAL at 08:21

## 2021-08-14 RX ADMIN — GABAPENTIN 900 MG: 300 CAPSULE ORAL at 08:21

## 2021-08-14 RX ADMIN — OXYCODONE HYDROCHLORIDE 10 MG: 5 TABLET ORAL at 09:19

## 2021-08-14 RX ADMIN — OMEPRAZOLE 20 MG: 20 CAPSULE, DELAYED RELEASE ORAL at 08:22

## 2021-08-14 RX ADMIN — ACETAMINOPHEN 975 MG: 325 TABLET, FILM COATED ORAL at 08:21

## 2021-08-14 RX ADMIN — GABAPENTIN 300 MG: 300 CAPSULE ORAL at 01:06

## 2021-08-14 RX ADMIN — OXYCODONE HYDROCHLORIDE 10 MG: 5 TABLET ORAL at 01:06

## 2021-08-14 RX ADMIN — METHOCARBAMOL 500 MG: 500 TABLET ORAL at 10:24

## 2021-08-14 RX ADMIN — METOPROLOL TARTRATE 100 MG: 100 TABLET, FILM COATED ORAL at 08:21

## 2021-08-14 RX ADMIN — ASPIRIN 325 MG: 325 TABLET, COATED ORAL at 08:22

## 2021-08-14 RX ADMIN — PRAVASTATIN SODIUM 40 MG: 20 TABLET ORAL at 08:22

## 2021-08-14 RX ADMIN — METHOCARBAMOL 500 MG: 500 TABLET ORAL at 03:51

## 2021-08-14 RX ADMIN — CEFAZOLIN SODIUM 2 G: 2 INJECTION, SOLUTION INTRAVENOUS at 00:21

## 2021-08-14 RX ADMIN — OXYCODONE HYDROCHLORIDE 10 MG: 5 TABLET ORAL at 05:11

## 2021-08-14 RX ADMIN — DOCUSATE SODIUM AND SENNOSIDES 1 TABLET: 8.6; 5 TABLET, FILM COATED ORAL at 08:22

## 2021-08-14 RX ADMIN — GABAPENTIN 300 MG: 300 CAPSULE ORAL at 00:21

## 2021-08-14 ASSESSMENT — ACTIVITIES OF DAILY LIVING (ADL): PREVIOUS_RESPONSIBILITIES: MEAL PREP;HOUSEKEEPING;LAUNDRY;MEDICATION MANAGEMENT;SHOPPING;DRIVING

## 2021-08-14 NOTE — PHARMACY-ADMISSION MEDICATION HISTORY
Admission medication history interview status for this patient is complete. See [facility] admission navigator for allergy information, prior to admission medications and immunization status.     Medication history interview done, indicate source(s): Patient  Medication history resources (including written lists, pill bottles, clinic record):None      Changes made to PTA medication list:  Added: oxycodone  Changed: gabapentin and lopressor  Reported as Not Taking: none  Removed: none    Actions taken by pharmacist (provider contacted, etc):None     Additional medication history information:None    Medication reconciliation/reorder completed by provider prior to medication history?  y   (Y/N)       Prior to Admission medications    Medication Sig Last Dose Taking? Auth Provider   acetaminophen (TYLENOL) 325 MG tablet Take 2 tablets (650 mg) by mouth every 4 hours as needed for other (mild pain)  Yes Phillip Dozier MD   aspirin (ASA) 325 MG EC tablet Take 1 tablet (325 mg) by mouth daily  Yes Phillip Dozier MD   aspirin (ASA) 81 MG chewable tablet Take 81 mg by mouth daily Past Week at Unknown time Yes Reported, Patient   calcium carbonate-vitamin D (OSCAL W/D) 500-200 MG-UNIT tablet Take 1 tablet by mouth daily 8/12/2021 at Unknown time Yes Reported, Patient   Cinnamon Bark POWD Take 500 mg by mouth daily  8/13/2021 at Unknown time Yes Reported, Patient   empagliflozin (JARDIANCE) 10 MG TABS tablet Take 10 mg by mouth daily Past Week at Unknown time Yes Reported, Patient   furosemide (LASIX) 20 MG tablet Take 20 mg by mouth daily Past Week at Unknown time Yes Reported, Patient   gabapentin (NEURONTIN) 600 MG tablet Take 900 mg by mouth 2 times daily At AM and dinner (1800) 8/13/2021 at am Yes Reported, Patient   gabapentin (NEURONTIN) 600 MG tablet Take 1,200 mg by mouth At Bedtime 8/12/2021 at Unknown time Yes Unknown, Entered By History   hydrOXYzine (ATARAX) 10 MG tablet Take 1 tablet (10 mg) by mouth  every 6 hours as needed for itching or anxiety (with pain, moderate pain)  Yes Phillip Dozier MD   ibuprofen (ADVIL/MOTRIN) 600 MG tablet Take 1 tablet (600 mg) by mouth every 6 hours as needed for pain (mild)  Yes Phillip Dozier MD   lisinopril (ZESTRIL) 40 MG tablet Take 40 mg by mouth At Bedtime  8/12/2021 at Unknown time Yes Reported, Patient   loperamide (IMODIUM A-D) 2 MG tablet Take 2 mg by mouth 3 times daily as needed for diarrhea 8/13/2021 at Unknown time Yes Reported, Patient   metFORMIN (GLUCOPHAGE) 500 MG tablet Take 2,000 mg by mouth daily (with dinner)  8/12/2021 at Unknown time Yes Reported, Patient   metoprolol tartrate (LOPRESSOR) 50 MG tablet Take 100 mg by mouth 2 times daily  8/13/2021 at 2nd-75mg Yes Reported, Patient   multivitamin (CENTRUM SILVER) tablet Take 1 tablet by mouth daily Past Week at Unknown time Yes Reported, Patient   omeprazole (PRILOSEC) 20 MG DR capsule Take 20 mg by mouth daily 8/13/2021 at Unknown time Yes Reported, Patient   oxyCODONE (ROXICODONE) 5 MG tablet 1 tab po q 4-6 hrs prn pain 3-6 on pain scale  2 tabs po q 4-6hrs prn pain 7-10 on pain scale  Yes Phillip Dozier MD   oxyCODONE (ROXICODONE) 5 MG tablet Take 5 mg by mouth every 8 hours as needed for severe pain 8/13/2021 at 2200 Yes Unknown, Entered By History   potassium chloride ER (KLOR-CON M) 10 MEQ CR tablet Take 10 mEq by mouth daily 8/13/2021 at Unknown time Yes Reported, Patient   pravastatin (PRAVACHOL) 40 MG tablet Take 40 mg by mouth daily 8/13/2021 at Unknown time Yes Reported, Patient   senna-docusate (SENOKOT-S/PERICOLACE) 8.6-50 MG tablet Take 1-2 tablets by mouth 2 times daily Take while on oral narcotics to prevent or treat constipation.  Yes Phillip Dozier MD

## 2021-08-14 NOTE — PROGRESS NOTES
Orthopedic Surgery  Germán Henry Kay  2021  Admit Date:  2021  POD 1 Day Post-Op  S/P Procedure(s):  Left total knee arthroplasty    Patient resting comfortably in bed   Pain controlled on oral analgesics  Tolerating oral intake.    No events overnight.   Denies chest pain or shortness of breath   Alert and orient to person, place, and time.    Vital Sign Ranges  Temperature Temp  Av.4  F (36.3  C)  Min: 97  F (36.1  C)  Max: 97.9  F (36.6  C)   Blood pressure Systolic (24hrs), Av , Min:141 , Max:197        Diastolic (24hrs), Av, Min:52, Max:125      Pulse Pulse  Av.2  Min: 63  Max: 91   Respirations Resp  Av.4  Min: 7  Max: 24   Pulse oximetry SpO2  Av.3 %  Min: 90 %  Max: 97 %       Unlabored breathing without audible wheeze   Dressing is clean, dry, and intact. Minimal erythema of the surrounding skin.  Bilateral calves are soft, non-tender.  Left lower extremity is NVI.  Able to resist dorsi and plantar flexion  + DP pulse     Labs:  No results for input(s): POTASSIUM in the last 79799 hours.  Recent Labs   Lab Test 21  0559   HGB 11.6*     No results for input(s): INR in the last 57974 hours.  No results for input(s): PLT in the last 64574 hours.    A/P: 64 yo female s/p L TKA on  with Dr. Dozier   1.    Continue ASA for DVT prophylaxis.     Mobilize with PT/OT WBAT     Continue current pain regiment.   Follow up with Dr. Dozier' team 2 weeks post op     2. Disposition   Anticipate d/c to home later today     Crow Ellis PA-C

## 2021-08-14 NOTE — PROGRESS NOTES
"   08/14/21 0800   Quick Adds   Type of Visit Initial Occupational Therapy Evaluation       Present no   Living Environment   People in home spouse;child(osmin), adult   Current Living Arrangements house   Number of Stairs, Main Entrance 5   Stair Railings, Main Entrance railings safe and in good condition   Number of Stairs, Within Home, Primary none   Living Environment Comments home on one level once inside home. Has a bathtub, her  will install bar. She does has a raised toilet seat   Self-Care   Usual Activity Tolerance good   Current Activity Tolerance moderate   Equipment Currently Used at Home none   Instrumental Activities of Daily Living (IADL)   Previous Responsibilities meal prep;housekeeping;laundry;medication management;shopping;driving   IADL Comments Patient reports she was ind with all IADLs prior to surgery, supportive spouse will assist with IADLs at discharge    Disability/Function   Fall history within last six months no   General Information   Onset of Illness/Injury or Date of Surgery 08/13/21   Referring Physician Phillip Dozier MD   Patient/Family Therapy Goal Statement (OT) \"get back home\"    Additional Occupational Profile Info/Pertinent History of Current Problem Patient is a pleasant 65 year old female with a TKA complete on 8/13/2021   Performance Patterns (Routines, Roles, Habits) Patient reports she was ind with all self care prior to surgery. She does enjoy gardening and reading    Existing Precautions/Restrictions weight bearing;fall   Left Lower Extremity (Weight-bearing Status) weight-bearing as tolerated (WBAT)   Cognitive Status Examination   Orientation Status orientation to person, place and time   Follows Commands WFL   Pain Assessment   Patient Currently in Pain Yes, see Vital Sign flowsheet   Range of Motion Comprehensive   General Range of Motion no range of motion deficits identified   Strength Comprehensive (MMT)   General Manual Muscle " "Testing (MMT) Assessment no strength deficits identified   Bed Mobility   Bed Mobility supine-sit;sit-supine   Supine-Sit [place] (Bed Mobility) contact guard   Sit-Supine [place] (Bed Mobility) contact guard   Assistive Device (Bed Mobility) bed rails   Transfers   Transfers sit-stand transfer   Sit-Stand Transfer   Sit-Stand [place] (Transfers) contact guard   Activities of Daily Living   BADL Assessment lower body dressing   Lower Body Dressing Assessment   [place] Level (Lower Body Dressing) set up   Assistive Devices (Lower Body Dressing) reacher   Position (Lower Body Dressing) edge of bed sitting   Comment (Lower Body Dressing) Patient will need assistance with donning/doffing socks    Clinical Impression   Criteria for Skilled Therapeutic Interventions Met (OT) yes;meets criteria;skilled treatment is necessary   OT Diagnosis TKA   OT Problem List-Impairments impacting ADL activity tolerance impaired;balance;pain;mobility   ADL comments/analysis Patient's knee mobility impacts ability to complete LE dressing with donning/doffing socks. Pain interferes with prolonged sitting due to \"nerve pain\" Patient is able to complete transfers CGA with modifcation of leg positioning.    Assessment of Occupational Performance 5 or more Performance Deficits   Planned Therapy Interventions (OT) ADL retraining;IADL retraining;strengthening;transfer training   Clinical Decision Making Complexity (OT) low complexity   Therapy Frequency (OT) 1x eval and treat   OT Discharge Planning    OT Discharge Recommendation (DC Rec)   (Defer to ortho)   OT Rationale for DC Rec Patient to discharge home, anticipate will require assist with tub transfer and higher level IADLs of which  can support    Total Evaluation Time (Minutes)   Total Evaluation Time (Minutes) 8     "

## 2021-08-14 NOTE — PROGRESS NOTES
[facility]    Medicine Progress Note - Hospitalist Service       Date of Admission:  8/13/2021    Assessment & Plan           This patient is a 65 year old female with a PMH significant for DM II, HTN, BRONWYN, HLP and GERD who is POD 0 s/p left total knee arthroplasty for osteoarthritis.      # S/p  left total knee arthroplasty for osteoarthritis  Overall doing well.   -cont PT/OT  -pain control as per primary     #DM II  Last A1c was 7.3  -Hold Jardiance  -Continue Metformin  -ISS ordered     #HTN  -Continue Metoprolol and Lisinopril on parameters  -resume Lasix at home.     #HLP  -Continue Pravastatin     #GERD  -Continue Omeprazole       Diet: Advance Diet as Tolerated: Regular Diet Adult  Regular Diet Adult    DVT Prophylaxis: Defer to primary service  Kramer Catheter: Not present  Central Lines: None  Code Status: Full Code      Disposition Plan   Expected discharge: 08/14/2021 recommended to prior living arrangement once adequate pain management/ tolerating PO medications.     The patient's care was discussed with the Patient.    Adolfo Valdes MD  Hospitalist Service  [facility]  Securely message with the Vocera Web Console (learn more here)  Text page via [employer] Paging/Directory      Clinically Significant Risk Factors Present on Admission              # Platelet Defect: home medication list includes an antiplatelet medication      ______________________________________________________________________    Interval History     L knee pain. No fevers/chills.    Data reviewed today: I reviewed all medications, new labs and imaging results over the last 24 hours. I personally reviewed no images or EKG's today.    Physical Exam   Vital Signs: Temp: 98.2  F (36.8  C) Temp src: Temporal BP: (!) 145/65 Pulse: 86   Resp: 19 SpO2: 96 % O2 Device: None (Room air) Oxygen Delivery: 2 LPM  Weight: 218 lbs 9.6 oz    Gen - AAO x 3.  Lungs - CTA B.  Heart - RR,S1+S2 nml, no  m/g/r.  Abd - soft, NT, ND, + BS.  Ext - LLE in KI.    Data   Recent Labs   Lab 08/14/21  0806 08/14/21  0559 08/14/21  0328 08/13/21  2222 08/13/21  0742   HGB  --  11.6*  --   --   --    CR  --   --   --   --  0.92   *  --  165* 118*  --      Recent Results (from the past 24 hour(s))   XR Knee Port Left 1/2 Views    Narrative    Examination:  XR KNEE PORT LEFT 1/2 VIEWS    Date:  8/13/2021 11:18 AM     Clinical Information: Post-Op Total Knee    Comparison: none.      Impression    Impression:    1.  Completed cemented left total knee arthroplasty. Normal joint  alignment. No evidence of periprosthetic fracture or other hardware  complication. Air in the joint space and surrounding soft tissues.  Skin staples in place.    JAC CHUNG MD         SYSTEM ID:  [id number]     Medications     lactated ringers 100 mL/hr at 08/13/21 2116       acetaminophen  975 mg Oral Q8H     aspirin  325 mg Oral Daily     gabapentin  1,200 mg Oral At Bedtime     gabapentin  900 mg Oral BID w/meals     insulin aspart  1-7 Units Subcutaneous TID AC     insulin aspart  1-5 Units Subcutaneous At Bedtime     lisinopril  40 mg Oral Daily     metFORMIN  2,000 mg Oral Daily with breakfast     metoprolol tartrate  100 mg Oral BID     omeprazole  20 mg Oral Daily     polyethylene glycol  17 g Oral Daily     potassium chloride ER  10 mEq Oral Daily     pravastatin  40 mg Oral Daily     senna-docusate  1 tablet Oral BID     sodium chloride (PF)  3 mL Intracatheter Q8H

## 2021-08-14 NOTE — PLAN OF CARE
Occupational Therapy Discharge Summary    Reason for therapy discharge:    All goals and outcomes met, no further needs identified.    Progress towards therapy goal(s). See goals on Care Plan in [facility] electronic health record for goal details.  Goals met    Therapy recommendation(s):    No further therapy is recommended.  Defer to ortho

## 2021-08-14 NOTE — PLAN OF CARE
Patient vital signs are at baseline: Yes  Patient able to ambulate as they were prior to admission or with assist devices provided by therapies during their stay:  Yes, assist of 1, walker, and gait belt.  Patient MUST void prior to discharge:  Yes  Patient able to tolerate oral intake:  Yes  Pain has adequate pain control using Oral analgesics:  Yes, oxycodone and tylenol.    Pt doing very well. LS clear, BS present, unable to pass flatus. CMS intact with no numbness/tingling. Drsg CDI. Plan is to discharge to home tomorrow, 8/15/21.

## 2021-08-14 NOTE — PLAN OF CARE
Physical Therapy Discharge Summary    Reason for therapy discharge:    Discharged to home.    Progress towards therapy goal(s). See goals on Care Plan in [facility] electronic health record for goal details.  Patient meeting all goals except independence with bed mobility, supine to sit CGA. Spouse is able to help at home with this. Sit to supine IND.     Therapy recommendation(s):    Defer to ortho

## 2021-08-14 NOTE — PROGRESS NOTES
Patient vital signs are at baseline: Yes, ex needing 2L O2 when sleeping and not on CPAP  Patient able to ambulate as they were prior to admission or with assist devices provided by therapies during their stay:  Yes  Patient MUST void prior to discharge:  Yes  Patient able to tolerate oral intake:  Yes  Pain has adequate pain control using Oral analgesics:  Yes    Voided 200 mls at 1600

## 2021-08-14 NOTE — PLAN OF CARE
Patient vital signs are at baseline: Yes  Patient able to ambulate as they were prior to admission or with assist devices provided by therapies during their stay:  Yes  Patient MUST void prior to discharge:  Yes  Patient able to tolerate oral intake:  Yes  Pain has adequate pain control using Oral analgesics:  Yes    Patient is Alert and oriented. Taking oxycodone for pain control. A-1 with transfers. Voided 600. Passing gas. Lung sounds clear. Will continue to monitor.

## 2021-08-15 NOTE — PLAN OF CARE
Reviewed discharge instructions and medications with patient. Questions answered. Patient discharged to home with spouse driving, discharge instructions, medications (Oxycodone/Robaxin/Aspirin/Tylenol/Ibuprofen/Senna), and belongings at this time.

## 2021-08-19 NOTE — PLAN OF CARE
[facility]      OUTPATIENT PHYSICAL THERAPY EVALUATION  PLAN OF TREATMENT FOR OUTPATIENT REHABILITATION  (COMPLETE FOR INITIAL CLAIMS ONLY)  Patient's Last Name, First Name, M.I.  YOB: 1955  KayGermán  Rhina Carl                        Provider's Name  [facility] Medical Record No.  [id number]                               Onset Date:  08/13/21   Start of Care Date:  08/13/21      Type:     _X_PT   ___OT   ___SLP Medical Diagnosis:  status post L TKA                        PT Diagnosis:  decreased functional mobility   Visits from SOC:  1   _________________________________________________________________________________  Plan of Treatment/Functional Goals    Planned Interventions: balance training, bed mobility training, gait training, home exercise program, strengthening, stair training, transfer training, risk factor education, home program guidelines     Goals: See Physical Therapy Goals on Care Plan in [facility] electronic health record.    Therapy Frequency: Daily  Predicted Duration of Therapy Intervention: 2 days  _________________________________________________________________________________    I CERTIFY THE NEED FOR THESE SERVICES FURNISHED UNDER        THIS PLAN OF TREATMENT AND WHILE UNDER MY CARE     (Physician co-signature of this document indicates review and certification of the therapy plan).              Certification date from: 08/13/21, Certification date to: 08/14/21    Referring Physician: Dr. Dozier            Initial Assessment        See Physical Therapy evaluation dated 08/13/21 in Epic electronic health record.

## 2021-08-28 NOTE — PROGRESS NOTES
[facility]      OUTPATIENT OCCUPATIONAL THERAPY  EVALUATION  PLAN OF TREATMENT FOR OUTPATIENT REHABILITATION  (COMPLETE FOR INITIAL CLAIMS ONLY)  Patient's Last Name, First Name, M.I.  YOB: 1955  Germán Villanueva Pedroanne marie                          Provider's Name  [facility] Medical Record No.  [id number]                               Onset Date:  08/13/21   Start of Care Date:  08/14/21     Type:     ___PT   _X_OT   ___SLP Medical Diagnosis:  left TKA                        OT Diagnosis:  TKA   Visits from SOC:  1   _________________________________________________________________________________  Plan of Treatment/Functional Goals    Planned Interventions: ADL retraining, IADL retraining, strengthening, transfer training   Goals: See Occupational Therapy Goals on Care Plan in [facility] electronic health record.    Therapy Frequency: 1x eval and treat  Predicted Duration of Therapy Intervention:    _________________________________________________________________________________    I CERTIFY THE NEED FOR THESE SERVICES FURNISHED UNDER        THIS PLAN OF TREATMENT AND WHILE UNDER MY CARE     (Physician co-signature of this document indicates review and certification of the therapy plan).                Certification date from: 08/14/21, Certification date to: 08/14/21    Referring Physician: Phillip Dozier MD            Initial Assessment        See Occupational Therapy evaluation dated 08/14/21 in Epic electronic health record.

## 2023-09-15 ENCOUNTER — TRANSFERRED RECORDS (OUTPATIENT)
Dept: HEALTH INFORMATION MANAGEMENT | Facility: CLINIC | Age: 68
End: 2023-09-15

## 2023-11-30 ENCOUNTER — HOSPITAL ENCOUNTER (OUTPATIENT)
Facility: CLINIC | Age: 68
End: 2023-11-30
Attending: ORTHOPAEDIC SURGERY | Admitting: ORTHOPAEDIC SURGERY
Payer: COMMERCIAL

## 2024-01-23 VITALS — HEIGHT: 65 IN | BODY MASS INDEX: 35.65 KG/M2 | WEIGHT: 214 LBS

## 2024-01-23 RX ORDER — METFORMIN HCL 500 MG
2000 TABLET, EXTENDED RELEASE 24 HR ORAL
COMMUNITY
End: 2024-01-30 | Stop reason: HOSPADM

## 2024-01-23 RX ORDER — MELOXICAM 15 MG/1
15 TABLET ORAL DAILY
COMMUNITY
End: 2024-01-23

## 2024-01-23 RX ORDER — OXYCODONE HYDROCHLORIDE 5 MG/1
5 TABLET ORAL EVERY 6 HOURS PRN
COMMUNITY
End: 2024-01-30 | Stop reason: HOSPADM

## 2024-01-23 RX ORDER — MULTIVITAMIN WITH IRON
1 TABLET ORAL DAILY
COMMUNITY
End: 2024-01-30 | Stop reason: HOSPADM

## 2024-01-23 RX ORDER — ACETAMINOPHEN 325 MG/1
650 TABLET ORAL EVERY 4 HOURS PRN
COMMUNITY
End: 2024-01-30 | Stop reason: HOSPADM

## 2024-01-23 RX ORDER — FLUOCINONIDE TOPICAL SOLUTION USP, 0.05% 0.5 MG/ML
SOLUTION TOPICAL 2 TIMES DAILY PRN
COMMUNITY
End: 2024-01-30 | Stop reason: HOSPADM

## 2024-01-23 RX ORDER — AMLODIPINE BESYLATE 2.5 MG/1
2.5 TABLET ORAL DAILY
COMMUNITY
End: 2024-01-30 | Stop reason: HOSPADM

## 2024-01-23 RX ORDER — ATORVASTATIN CALCIUM 40 MG/1
40 TABLET, FILM COATED ORAL DAILY
COMMUNITY
End: 2024-01-30 | Stop reason: HOSPADM

## 2024-01-23 RX ORDER — ASPIRIN 81 MG/1
81 TABLET ORAL 2 TIMES DAILY
COMMUNITY
End: 2024-01-30 | Stop reason: HOSPADM

## 2024-01-23 RX ORDER — OMEPRAZOLE 40 MG/1
40 CAPSULE, DELAYED RELEASE ORAL DAILY
COMMUNITY
End: 2024-01-30 | Stop reason: HOSPADM

## 2024-01-23 RX ORDER — DULAGLUTIDE 3 MG/.5ML
3 INJECTION, SOLUTION SUBCUTANEOUS WEEKLY
COMMUNITY
End: 2024-01-30 | Stop reason: HOSPADM

## 2024-01-23 NOTE — PROVIDER NOTIFICATION
Scheduled for right total hip arthroplasty 1/31/24 with Dr. Seo at [facility].    Informed Dr. Seo's team about Hgb 11.1 on 1/10/24 at H&P and A1C 8.2 on 11/15/23. Also informed them that patient has a nickel allergy.  They responded and they want the A1C rechecked before surgery.  Also, checked with Dr. Seo's team if he is OK with her taking aspirin 81 mg twice a day up to surgery. They will call the patient and advise her if she can continue aspirin twice a day up to surgery or decrease it to once a day.     Mila Mackey RN

## 2024-01-23 NOTE — PROGRESS NOTES
Planning to discharge home on POD 1 in the morning with her  helping her.       01/23/24 7660   Discharge Planning   Patient/Family Anticipates Transition to home with family   Concerns to be Addressed all concerns addressed in this encounter   Living Arrangements   People in Home spouse;child(osmin), adult;grandchild(osmin)  (, adult son, adult granddaughter, and granddaughter in high school)   Type of Residence Private Residence   Is your private residence a single family home or apartment? Single family home   Number of Stairs, Within Home, Primary none  (4 exterior steps with railing)   Once home, are you able to live on one level? Yes   Which level? Main Level   Bathroom Shower/Tub Tub/Shower unit   Equipment Currently Used at Home grab bar, tub/shower;raised toilet seat;cane, straight  (Has a walker at home)   Support System   Support Systems Spouse/Significant Other  (, Balwinder)   Do you have someone available to stay with you one or two nights once you are home? Yes   Education   Patient attended total joint pre-op class/received pre-op teaching  email/phone call

## 2024-01-26 RX ORDER — MELOXICAM 15 MG/1
15 TABLET ORAL DAILY
COMMUNITY
Start: 2023-11-17 | End: 2024-01-30 | Stop reason: HOSPADM

## 2024-01-29 RX ORDER — TRANEXAMIC ACID 650 MG/1
1950 TABLET ORAL ONCE
Status: CANCELLED | OUTPATIENT
Start: 2024-01-29 | End: 2024-01-29

## 2024-01-29 RX ORDER — CEFAZOLIN SODIUM/WATER 2 G/20 ML
2 SYRINGE (ML) INTRAVENOUS SEE ADMIN INSTRUCTIONS
Status: CANCELLED | OUTPATIENT
Start: 2024-01-29

## 2024-01-29 RX ORDER — CEFAZOLIN SODIUM/WATER 2 G/20 ML
2 SYRINGE (ML) INTRAVENOUS
Status: CANCELLED | OUTPATIENT
Start: 2024-01-29

## 2024-02-05 ENCOUNTER — HOSPITAL ENCOUNTER (OUTPATIENT)
Facility: CLINIC | Age: 69
End: 2024-02-05
Attending: ORTHOPAEDIC SURGERY | Admitting: ORTHOPAEDIC SURGERY
Payer: COMMERCIAL

## 2024-02-14 RX ORDER — CEFAZOLIN SODIUM/WATER 2 G/20 ML
2 SYRINGE (ML) INTRAVENOUS SEE ADMIN INSTRUCTIONS
Status: CANCELLED | OUTPATIENT
Start: 2024-02-14

## 2024-02-14 RX ORDER — TRANEXAMIC ACID 650 MG/1
1950 TABLET ORAL ONCE
Status: CANCELLED | OUTPATIENT
Start: 2024-02-14 | End: 2024-02-14

## 2024-02-14 RX ORDER — CEFAZOLIN SODIUM/WATER 2 G/20 ML
2 SYRINGE (ML) INTRAVENOUS
Status: CANCELLED | OUTPATIENT
Start: 2024-02-14

## 2024-02-14 NOTE — PROGRESS NOTES
Planning to discharge home on POD 1 in the morning       02/14/24 0902   Discharge Planning   Patient/Family Anticipates Transition to home with family   Living Arrangements   People in Home spouse;grandchild(osmin);child(osmin), adult   Type of Residence Private Residence   Is your private residence a single family home or apartment? Single family home   Number of Stairs, Within Home, Primary none  (4 exterior steps)   Once home, are you able to live on one level? Yes   Which level? Main Level   Bathroom Shower/Tub Tub/Shower unit   Equipment Currently Used at Home walker, rolling;grab bar, tub/shower;raised toilet seat;cane, straight   Support System   Support Systems Spouse/Significant Other  (, Balwinder)   Do you have someone available to stay with you one or two nights once you are home? Yes

## 2024-02-20 NOTE — PROVIDER NOTIFICATION
Scheduled for right total hip arthroplasty 2/26/24 with Dr. Seo at [facility].    Informed Dr. Seo's team of A1C 8.6 and Hgb 11.3. The A1C is higher than the goal for it to be below 8.0 for surgery.    Mila Mackey RN

## 2024-06-25 NOTE — H&P (VIEW-ONLY)
[facility]      Preoperative Consultation   Germán Villanueva   : 1955   Gender: female    Date of Encounter: 2024    Nursing Notes:   Concepción Lopez  2024 11:42 AM  Signed  Chief Complaint   Patient presents with     Preoperative Exam     RIGHT TOTAL HIP ARTHROPLASTY, 2024, [facility]     Diabetes     Medication Management     Refill Oxycodone       Health Maintenance Due   Topic Date Due     Pneumococcal series for age 65+ (1 of 2 - PCV) Never done     Zoster (shingles) series for age 50+ (2 of 3) 2015     DEXA/DXA scan for age 65+  2019     Mammogram for age 45-75  2022     COVID-19 vaccine series ( - - season) Never done       Germán Villanueva is a 68 y.o. female (1955) who presents for preop evaluation undergoing RIGHT TOTAL HIP ARTHROPLASTY      Date of Surgery: 2024  Surgical Specialty: Orthopedic  Slava Seo MD - [facility]/Surgical Facility: [facility]  Fax number:   Surgery type: inpatient  Primary Physician: Nancy Kemp, [facility] 2024 11:41 AM       History of Present Illness   Germán is a 67yo female with pmh cva, hypertension, type 2 diabetes, obesity, gerd, bronwyn and chronic knee pain here today for preoperative clearance prior to right hip replacement.  Failed multiple outpatient treatments.    Requests refill oxy 5mg.   reviewed, last filled #30 6/10/24.  Taking 1-2 at bedtime.    A1c down to 7.6.  very happy about this.    Also has red spot on left lower leg.  Thinks growing.       Review of Systems   A comprehensive review of systems was negative except for items noted in HPI.    Patient Active Problem List   Diagnosis Code     Essential hypertension I10     Rosacea L71.9     BRONWYN (obstructive sleep apnea) G47.33     Hyperlipidemia E78.5     Type 2 diabetes mellitus without complication, without long-term current use of insulin  (HC) E11.9     Colon polyps K63.5     Cervical stenosis of spine M48.02     GERD (gastroesophageal reflux disease) K21.9     Degenerative disc disease, cervical M50.30     Degenerative disc disease, lumbar M51.36     Primary osteoarthritis of right knee M17.11     Non morbid obesity E66.9     S/P total knee arthroplasty Z96.659     Acute CVA (cerebrovascular accident) (HC) I63.9     Morbid exogenous obesity (HC) E66.01     Current Outpatient Medications   Medication Sig     acetaminophen (TYLENOL) 325 mg tablet Take 650 mg by mouth every 4 hours if needed for Pain. Max acetaminophen dose: 4000mg in 24 hrs.     amLODIPine (NORVASC) 2.5 mg tablet Take 1 tablet by mouth once daily     aspirin (ECOTRIN) 81 mg enteric coated tablet Patient takes 1 tablet by mouth twice daily     atorvastatin (LIPITOR) 40 mg tablet Take 1 Tablet (40 mg) by mouth at bedtime.     blood sugar diagnostic (ONETOUCH VERIO) strip Dispense test strips covered by the patient insurance. Test 2 times per day.     blood-glucose meter (Blood Glucose Monitoring) Dispense meter, test strips, lancets covered by pt ins. E11.65 NIDDM type II, uncontrolled - Test 2 times/day. Reason: High A1C     CALCIUM 500+D ORAL 1 tablet orally daily     cinnamon bark, bulk, powd Mix 500 mg in liquid then take by mouth.     CPAP CPAP machine for home use at pressure 10-20cm, nasal mask x1/3month with nasal cushion x2/mo; DJZ=059q, Frequency of use=daily     dulaglutide (Trulicity) 4.5 mg/0.5 mL subcutaneous pen INJECT 1 DOSE (4.5MG) SUBCUTANEOUSLY ONCE A WEEK     fluocinonide 0.05 TOPICAL (LIDEX) 0.05 % external solution      furosemide (LASIX) 20 mg tablet Take 1 Tablet (20 mg) by mouth every morning.     gabapentin (NEURONTIN) 600 mg tablet TAKE 1 1/2 TABLET BY MOUTH EVERY MORNING AND 2 TABLETS IN THE EVENING AND 1 1/2 TABLET EVERY NIGHT AT BEDTIME     ketoconazole 2% shampoo (NIZORAL) 2 % shampoo      lancets (ONE TOUCH DELICA) 33 gauge Misc Test 2 times a day.      lisinopriL (PRINIVIL; ZESTRIL) 40 mg tablet Take 1 Tablet (40 mg) by mouth once daily.     loperamide (IMODIUM) 2 mg capsule Take 1 Capsule (2 mg) by mouth 2 times daily if needed for Diarrhea. Take 2 capsules (4mg) orally with 1st loose stool, then 1 capsule (2mg) with other loose stools. Max 16 mg in 24 hrs.     medication order composer Magnesium, unknown dose     medication order composer Beet supplement     metFORMIN (GLUCOPHAGE XR) 500 mg Extended-Release tablet Take 4 Tablets (2,000 mg) by mouth once daily with evening meal.     metoprolol tartrate (LOPRESSOR) 50 mg tablet Take 2 Tablets (100 mg) by mouth two times daily.     multivitamins-minerals-lutein (CENTRUM SILVER) tab tablet Take 1 Tablet by mouth once daily.     omeprazole (PRILOSEC) 40 mg Delayed-Release capsule Take 1 Capsule (40 mg) by mouth once daily before a meal.     [START ON 6/28/2024] oxyCODONE (ROXICODONE) 5 mg immediate release tablet Take 1 Tablet (5 mg) by mouth 2 times daily if needed for Pain.     potassium citrate 99 mg cap Take by mouth.     triamcinolone (ARISTOCORT; KENALOG) 0.1 % cream Apply topically to affected area(s) two times daily. Do not use more than 10 days in a row     No current facility-administered medications for this visit.     Medications have been reviewed by me and are current to the best of my knowledge and ability.     Allergies   Allergen Reactions     Nickel Hives and Rash     Past Surgical History:   . Laterality Date     ANTERIOR  POSTERIOR CERVICAL FUSION C3-C7  09/23/2014     COLONOSCOPY SCREENING  07/01/2009    5 yr     DILATION AND CURETTAGE X 1       Drug Induced Sleep Endoscopy  10/19/2023    Dr Peña     LEFT ENDOSCOPIC RELEASE CARPAL TUNNEL Left 06/28/2022    Dr. Pickard     lumbar decompression  07/01/2010    fusion L4-S1     VAGINAL DELIVERY      6 children     WISDOM TEETH EXTRACTION       Social History     Tobacco Use     Smoking status: Former     Current packs/day: 0.00     Average packs/day:  "0.5 packs/day for 20.0 years (10.0 ttl pk-yrs)     Types: Cigarettes     Start date: 1982     Quit date: 2002     Years since quittin.4     Smokeless tobacco: Never     Tobacco comments:     QUIT    Vaping Use     Vaping status: Never Used   Substance Use Topics     Alcohol use: Yes     Alcohol/week: 1.0 - 2.0 standard drink of alcohol     Types: 1 - 2 Standard drinks or equivalent per week     Comment: a couple drinks per week     Drug use: Never     Family History   Problem Relation Age of Onset     Other Mother         scoliosis     Osteoarthritis Mother      Heart Disease Father      Cancer-prostate Father      Cancer Father         Skin     Sleep apnea Father      Snoring Father      Snoring Sister      Sleep apnea Brother      Snoring Brother      Cancer-breast Paternal Aunt         Post-Menopausal     Stroke Maternal Grandfather      Rheum arthritis Paternal Grandmother      Cancer-ovarian No Family History      PAST DIFFICULTY WITH ANESTHESIA: None     Physical Exam   /78 (Cuff Site: Right Arm, Position: Sitting, Cuff Size: Adult Large)   Pulse 72   Ht 1.66 m (5' 5.35\")   Wt 91.2 kg (201 lb)   SpO2 96%   BMI 33.09 kg/m   Body mass index is 33.09 kg/m .    General Appearance: Pleasant, alert, appropriate appearance for age. No acute distress  Head Exam: Normocephalic, without obvious abnormality.  Eye Exam: Normal external eye, conjunctiva, lids. SALVADOR.  Ear Exam: Normal TM's bilaterally. Normal auditory canals and external ears. Non-tender.  OroPharynx Exam: Dental hygiene adequate. Normal buccal mucosa. Normal pharynx.  Neck Exam: Supple, no masses or nodes.  Chest/Respiratory Exam: Normal chest wall and respirations. Clear to auscultation.  Cardiovascular Exam: Regular rate and rhythm.   Gastrointestinal Exam: Soft, nontender, no abnormal masses or organomegaly.  Musculoskeletal Exam: Back is straight and non-tender, full ROM of upper and lower extremities.  Skin: left lower leg, " anterior aspect has reddened area, slightly raised.  Irregular borders.  Approximately 1 in diameter  Neurologic Exam: Nonfocal; normal gross motor movement, tone, and coordination. No tremor.  Psychiatric Exam: Alert and oriented, appropriate affect.     Assessment / Plan       The Pre-Op Tool    Recommendations      Intermediate Risk Procedure    Risk of CV Complication (RCRI)  1%    Current Cardiac Status  Good exertional capacity ( > 4 mets )    Cardiac History  No history of coronary artery disease    Sleep Apnea  History of obstructive sleep apnea, on CPAP    COVID-19  COVID-19 > 7 weeks ago, now asymptomatic: proceed with surgery as planned.           Labs  HGB within last 30 days  Potassium within last 30 days  EKG  Baseline EKG within the last 12 months  CXR  Not indicated    Stress Testing  Not indicated    * Testing recommendations are intended to assist, but not direct, clinical decisions.           Type & Screen should be obtained by Anesthesia only if the risk of transfusion is > 5% for the procedure         Do not take metformin the evening before the procedure or the morning of the procedure.  Do not take once weekly non-insulin injectable diabetes/weight loss medication for 7 days prior to the procedure.  Hold Lisinopril (Prinvil, Zestril) the evening before and/or morning of the procedure.  Continue taking Metoprolol (Lopressor, Toprol); be sure to take the evening before and/or morning of the procedure.  Hold Furosemide (Lasix) on the morning of procedure.  Take your potassium pill as usual the morning of the procedure  Take your usual dose of opioid pain medicine the morning of procedure  Continue Aspirin 81mg perioperatively  Take your other medications as usual prior to the procedure  Hold vitamins and/or supplements for 1 week prior to the procedure  Hold fish oil for 2 weeks prior to the procedure  Okay to take Acetaminophen (Tylenol) up until the procedure  Hold / avoid NSAIDs (e.g.  ibuprofen, naproxen) prior to procedure: 2 days for ibuprofen (Advil) and 4 days for naproxen (Aleve).    * Medication recommendations are not intended to be exhaustive; they are limited to common medications that are potentially dangerous if incorrectly managed          Labs  * Data supports elimination of  routine  laboratory testing in favor of focused,  indicated  testing based on medical co-morbidities. A 2009 study randomized 1061 patients undergoing ambulatory, non-cataract surgery to routine or to indicated testing. Perioperative adverse events were similar (Anesthesia & Analgesia 2009;108:467-75; Anesthesiol. Clin. 2016 Mar;34(1):43-58).  Stress Testing  * Data from high risk patients undergoing major vascular surgery have failed to demonstrate benefit from either preoperative stress testing or prophylactic revascularization. A large, observational study found low risk of major perioperative adverse events in patients able to achieve =4 mets. Taken together with existing knowledge, for patients with known or suspected CAD, our experts recommend preoperative stress testing only if it is indicated regardless of the planned surgery (N Engl J Med 2004;351:2795-80; J Am Gama Cardiol 2014;64:9248-2885; FERNANDEZ 2020;324:274-290;).  Antiplatelet Therapy  * In patients who have a history of percutaneous coronary drug-eluting stent that occurred > 1 year ago, or a bare-metal stent > 6 months ago, or angioplasty > 6 weeks ago, preoperative low-dose aspirin appears to reduce the risk of death and nonfatal myocardial infarction following non-cardiac surgery. In most circumstances our experts recommend continuation of aspirin in these patients undergoing low to intermediate bleeding risk procedures, or total joint replacement. (Reanna Intern Med. 2018;168:237-44).  RAAS Antagonist  * Hypotension during anesthesia is associated with continuing renin-angiotensin system (RAAS) antagonist. While it is unclear if holding RAAS  antagonists reduces postoperative complications, in most circumstances our experts recommend holding RAAS antagonist 24 hours prior to surgery. (Postgrad Med J 2011;87:472-81; Anest 2017;126:16-27; BMC Anesthesiol 2018;18:26.)     Session ID: [id number]  Endnotes and bibliography available upon request: [email or web address]    Labs: pending  ECG: no    ICD-10-CM    1. Pre-op evaluation  Z01.818 HEMOGLOBIN     BASIC METABOLIC PANEL      2. Type 2 diabetes mellitus without complication, without long-term current use of insulin (HC)  E11.9 HEMOGLOBIN A1C MONITORING (POCT)     BASIC METABOLIC PANEL     metFORMIN (GLUCOPHAGE XR) 500 mg Extended-Release tablet      3. Need for zoster vaccination  Z23       4. Postmenopausal  Z78.0       5. Breast cancer screening by mammogram  Z12.31       6. Chronic diarrhea  K52.9 loperamide (IMODIUM) 2 mg capsule      7. Rash  R21 triamcinolone (ARISTOCORT; KENALOG) 0.1 % cream      8. Cervical stenosis of spine  M48.02 oxyCODONE (ROXICODONE) 5 mg immediate release tablet        Improved control of type 2 diabetes.  No changes to plan at this time.    Cleared for surgery pending normal creatinine and potassium levels.  Refilled oxycodone.   reviewed.    Twice daily triamcinolone to lesion on left lower leg.  If it does not heal she will need to return to clinic for further evaluation, potentially biopsy.    Patient is cleared, pending tests ordered today, for planned procedure.   Electronically Signed by:   Anupama Mo NP ....................  6/25/2024   12:01 PM  6/25/2024

## 2024-07-03 RX ORDER — ASPIRIN 81 MG/1
81 TABLET ORAL 2 TIMES DAILY
Status: ON HOLD | COMMUNITY
End: 2024-07-12

## 2024-07-03 RX ORDER — AMLODIPINE BESYLATE 2.5 MG/1
2.5 TABLET ORAL DAILY
COMMUNITY

## 2024-07-03 RX ORDER — METFORMIN HCL 500 MG
2000 TABLET, EXTENDED RELEASE 24 HR ORAL
COMMUNITY

## 2024-07-03 RX ORDER — MULTIVITAMIN WITH IRON
1 TABLET ORAL AT BEDTIME
COMMUNITY

## 2024-07-03 RX ORDER — ACETAMINOPHEN 325 MG/1
325-650 TABLET ORAL EVERY 4 HOURS PRN
COMMUNITY

## 2024-07-03 RX ORDER — ATORVASTATIN CALCIUM 40 MG/1
40 TABLET, FILM COATED ORAL
COMMUNITY

## 2024-07-03 RX ORDER — OXYCODONE HYDROCHLORIDE 5 MG/1
5 TABLET ORAL 2 TIMES DAILY PRN
Status: ON HOLD | COMMUNITY
End: 2024-07-12

## 2024-07-03 RX ORDER — TRIAMCINOLONE ACETONIDE 1 MG/G
CREAM TOPICAL 2 TIMES DAILY PRN
COMMUNITY

## 2024-07-03 RX ORDER — OMEPRAZOLE 40 MG/1
40 CAPSULE, DELAYED RELEASE ORAL
COMMUNITY

## 2024-07-03 RX ORDER — DULAGLUTIDE 4.5 MG/.5ML
4.5 INJECTION, SOLUTION SUBCUTANEOUS WEEKLY
COMMUNITY

## 2024-07-03 NOTE — PROGRESS NOTES
Planning to discharge home on POD 1 in the morning with her  helping her.         07/03/24 6593   Discharge Planning   Patient/Family Anticipates Transition to home with family   Concerns to be Addressed all concerns addressed in this encounter   Living Arrangements   People in Home spouse;grandchild(osmin);child(osmin), adult   Type of Residence Private Residence   Is your private residence a single family home or apartment? Single family home   Number of Stairs, Within Home, Primary none  (4 exterior)   Once home, are you able to live on one level? Yes   Which level? Main Level   Bathroom Shower/Tub Tub/Shower unit   Equipment Currently Used at Home cane, straight;grab bar, tub/shower;raised toilet seat  (Has a 4 wheeled walker at home)   Support System   Support Systems Spouse/Significant Other  (, Balwinder)   Do you have someone available to stay with you one or two nights once you are home? Yes   Education   Patient attended total joint pre-op class/received pre-op teaching  email/phone call

## 2024-07-11 ENCOUNTER — ANESTHESIA EVENT (OUTPATIENT)
Dept: SURGERY | Facility: CLINIC | Age: 69
End: 2024-07-11
Payer: COMMERCIAL

## 2024-07-11 ENCOUNTER — APPOINTMENT (OUTPATIENT)
Dept: RADIOLOGY | Facility: CLINIC | Age: 69
End: 2024-07-11
Attending: ORTHOPAEDIC SURGERY
Payer: COMMERCIAL

## 2024-07-11 ENCOUNTER — HOSPITAL ENCOUNTER (OUTPATIENT)
Facility: CLINIC | Age: 69
Discharge: HOME | End: 2024-07-12
Attending: ORTHOPAEDIC SURGERY | Admitting: ORTHOPAEDIC SURGERY
Payer: COMMERCIAL

## 2024-07-11 ENCOUNTER — ANESTHESIA (OUTPATIENT)
Dept: SURGERY | Facility: CLINIC | Age: 69
End: 2024-07-11
Payer: COMMERCIAL

## 2024-07-11 ENCOUNTER — APPOINTMENT (OUTPATIENT)
Dept: PHYSICAL THERAPY | Facility: CLINIC | Age: 69
End: 2024-07-11
Attending: ORTHOPAEDIC SURGERY
Payer: COMMERCIAL

## 2024-07-11 DIAGNOSIS — M16.11 PRIMARY OSTEOARTHRITIS OF RIGHT HIP: Primary | ICD-10-CM

## 2024-07-11 LAB
GLUCOSE BLDC GLUCOMTR-MCNC: 119 MG/DL (ref 70–99)
GLUCOSE BLDC GLUCOMTR-MCNC: 158 MG/DL (ref 70–99)
GLUCOSE BLDC GLUCOMTR-MCNC: 163 MG/DL (ref 70–99)

## 2024-07-11 PROCEDURE — 999N000065 XR PELVIS PORT 1/2 VIEWS

## 2024-07-11 PROCEDURE — 258N000001 HC RX 258: Performed by: ORTHOPAEDIC SURGERY

## 2024-07-11 PROCEDURE — 272N000001 HC OR GENERAL SUPPLY STERILE: Performed by: ORTHOPAEDIC SURGERY

## 2024-07-11 PROCEDURE — 710N000010 HC RECOVERY PHASE 1, LEVEL 2, PER MIN: Performed by: ORTHOPAEDIC SURGERY

## 2024-07-11 PROCEDURE — C1776 JOINT DEVICE (IMPLANTABLE): HCPCS | Performed by: ORTHOPAEDIC SURGERY

## 2024-07-11 PROCEDURE — 99214 OFFICE O/P EST MOD 30 MIN: CPT | Mod: FS | Performed by: HOSPITALIST

## 2024-07-11 PROCEDURE — 250N000013 HC RX MED GY IP 250 OP 250 PS 637: Performed by: PHYSICIAN ASSISTANT

## 2024-07-11 PROCEDURE — 97116 GAIT TRAINING THERAPY: CPT | Mod: GP

## 2024-07-11 PROCEDURE — 250N000011 HC RX IP 250 OP 636: Performed by: ANESTHESIOLOGY

## 2024-07-11 PROCEDURE — 250N000009 HC RX 250: Performed by: PHYSICIAN ASSISTANT

## 2024-07-11 PROCEDURE — 250N000013 HC RX MED GY IP 250 OP 250 PS 637: Performed by: HOSPITALIST

## 2024-07-11 PROCEDURE — 370N000017 HC ANESTHESIA TECHNICAL FEE, PER MIN: Performed by: ORTHOPAEDIC SURGERY

## 2024-07-11 PROCEDURE — 82962 GLUCOSE BLOOD TEST: CPT

## 2024-07-11 PROCEDURE — C1713 ANCHOR/SCREW BN/BN,TIS/BN: HCPCS | Performed by: ORTHOPAEDIC SURGERY

## 2024-07-11 PROCEDURE — 97530 THERAPEUTIC ACTIVITIES: CPT | Mod: GP

## 2024-07-11 PROCEDURE — 999N000157 HC STATISTIC RCP TIME EA 10 MIN

## 2024-07-11 PROCEDURE — 250N000011 HC RX IP 250 OP 636: Performed by: NURSE ANESTHETIST, CERTIFIED REGISTERED

## 2024-07-11 PROCEDURE — 250N000011 HC RX IP 250 OP 636: Performed by: ORTHOPAEDIC SURGERY

## 2024-07-11 PROCEDURE — 250N000011 HC RX IP 250 OP 636: Performed by: PHYSICIAN ASSISTANT

## 2024-07-11 PROCEDURE — 258N000003 HC RX IP 258 OP 636: Performed by: ANESTHESIOLOGY

## 2024-07-11 PROCEDURE — 250N000013 HC RX MED GY IP 250 OP 250 PS 637: Performed by: ANESTHESIOLOGY

## 2024-07-11 PROCEDURE — 99207 PR APP CREDIT; MD BILLING SHARED VISIT: CPT | Mod: FS

## 2024-07-11 PROCEDURE — 250N000012 HC RX MED GY IP 250 OP 636 PS 637: Performed by: HOSPITALIST

## 2024-07-11 PROCEDURE — 97161 PT EVAL LOW COMPLEX 20 MIN: CPT | Mod: GP

## 2024-07-11 PROCEDURE — 258N000003 HC RX IP 258 OP 636: Performed by: NURSE ANESTHETIST, CERTIFIED REGISTERED

## 2024-07-11 PROCEDURE — 360N000077 HC SURGERY LEVEL 4, PER MIN: Performed by: ORTHOPAEDIC SURGERY

## 2024-07-11 PROCEDURE — 999N000141 HC STATISTIC PRE-PROCEDURE NURSING ASSESSMENT: Performed by: ORTHOPAEDIC SURGERY

## 2024-07-11 PROCEDURE — 250N000009 HC RX 250: Performed by: ORTHOPAEDIC SURGERY

## 2024-07-11 PROCEDURE — 250N000013 HC RX MED GY IP 250 OP 250 PS 637: Performed by: ORTHOPAEDIC SURGERY

## 2024-07-11 DEVICE — TITANIUM MODULAR HEAD SLEEVE 12/14                                    TAPER +4: Type: IMPLANTABLE DEVICE | Site: HIP | Status: FUNCTIONAL

## 2024-07-11 DEVICE — R3 20 DEGREE +4 XLPE ACETABULAR                                    LINER 40MM INNER DIAMETER X 56MM                                    OUTER DIAMETER
Type: IMPLANTABLE DEVICE | Site: HIP | Status: FUNCTIONAL
Brand: R3

## 2024-07-11 DEVICE — 40MM OXINIUM MODULAR FEMORAL HEAD
Type: IMPLANTABLE DEVICE | Site: HIP | Status: FUNCTIONAL
Brand: OXINIUM

## 2024-07-11 DEVICE — REFLECTION SPHERICAL HEAD SCREW 15MM
Type: IMPLANTABLE DEVICE | Site: HIP | Status: FUNCTIONAL
Brand: REFLECTION

## 2024-07-11 DEVICE — GUIDE PIN LONG ACETABULAR: Type: IMPLANTABLE DEVICE | Site: HIP | Status: FUNCTIONAL

## 2024-07-11 DEVICE — R3 3 HOLE ACETABULAR SHELL 56MM
Type: IMPLANTABLE DEVICE | Site: HIP | Status: FUNCTIONAL
Brand: R3 ACETABULAR

## 2024-07-11 DEVICE — REFLECTION SPHERICAL HEAD SCREW 25MM
Type: IMPLANTABLE DEVICE | Site: HIP | Status: FUNCTIONAL
Brand: REFLECTION

## 2024-07-11 DEVICE — IMP STEM S&N POLARSTEM STD TI/HA SZ4 NON CEM 75100467: Type: IMPLANTABLE DEVICE | Site: HIP | Status: FUNCTIONAL

## 2024-07-11 RX ORDER — OXYCODONE HYDROCHLORIDE 5 MG/1
10 TABLET ORAL
Status: CANCELLED | OUTPATIENT
Start: 2024-07-11

## 2024-07-11 RX ORDER — HYDROMORPHONE HCL IN WATER/PF 6 MG/30 ML
0.2 PATIENT CONTROLLED ANALGESIA SYRINGE INTRAVENOUS
Status: DISCONTINUED | OUTPATIENT
Start: 2024-07-11 | End: 2024-07-12 | Stop reason: HOSPADM

## 2024-07-11 RX ORDER — ONDANSETRON 2 MG/ML
INJECTION INTRAMUSCULAR; INTRAVENOUS PRN
Status: DISCONTINUED | OUTPATIENT
Start: 2024-07-11 | End: 2024-07-11

## 2024-07-11 RX ORDER — PROPOFOL 10 MG/ML
INJECTION, EMULSION INTRAVENOUS PRN
Status: DISCONTINUED | OUTPATIENT
Start: 2024-07-11 | End: 2024-07-11

## 2024-07-11 RX ORDER — CEFAZOLIN SODIUM/WATER 2 G/20 ML
2 SYRINGE (ML) INTRAVENOUS SEE ADMIN INSTRUCTIONS
Status: DISCONTINUED | OUTPATIENT
Start: 2024-07-11 | End: 2024-07-11 | Stop reason: HOSPADM

## 2024-07-11 RX ORDER — METHOCARBAMOL 500 MG/1
500 TABLET, FILM COATED ORAL EVERY 6 HOURS PRN
Status: DISCONTINUED | OUTPATIENT
Start: 2024-07-11 | End: 2024-07-12 | Stop reason: HOSPADM

## 2024-07-11 RX ORDER — PLANT STANOL ESTER 450 MG
2.5 TABLET ORAL EVERY EVENING
Status: DISCONTINUED | OUTPATIENT
Start: 2024-07-11 | End: 2024-07-12 | Stop reason: HOSPADM

## 2024-07-11 RX ORDER — NALOXONE HYDROCHLORIDE 0.4 MG/ML
0.2 INJECTION, SOLUTION INTRAMUSCULAR; INTRAVENOUS; SUBCUTANEOUS
Status: DISCONTINUED | OUTPATIENT
Start: 2024-07-11 | End: 2024-07-12 | Stop reason: HOSPADM

## 2024-07-11 RX ORDER — METFORMIN HCL 500 MG
2000 TABLET, EXTENDED RELEASE 24 HR ORAL
Status: DISCONTINUED | OUTPATIENT
Start: 2024-07-11 | End: 2024-07-12 | Stop reason: HOSPADM

## 2024-07-11 RX ORDER — LISINOPRIL 40 MG/1
40 TABLET ORAL AT BEDTIME
Status: DISCONTINUED | OUTPATIENT
Start: 2024-07-11 | End: 2024-07-12 | Stop reason: HOSPADM

## 2024-07-11 RX ORDER — ONDANSETRON 4 MG/1
4 TABLET, ORALLY DISINTEGRATING ORAL EVERY 30 MIN PRN
Status: CANCELLED | OUTPATIENT
Start: 2024-07-11

## 2024-07-11 RX ORDER — TRANEXAMIC ACID 650 MG/1
1950 TABLET ORAL ONCE
Status: COMPLETED | OUTPATIENT
Start: 2024-07-11 | End: 2024-07-11

## 2024-07-11 RX ORDER — OXYCODONE HYDROCHLORIDE 5 MG/1
5 TABLET ORAL EVERY 4 HOURS PRN
Status: DISCONTINUED | OUTPATIENT
Start: 2024-07-11 | End: 2024-07-12 | Stop reason: HOSPADM

## 2024-07-11 RX ORDER — ONDANSETRON 2 MG/ML
4 INJECTION INTRAMUSCULAR; INTRAVENOUS EVERY 30 MIN PRN
Status: CANCELLED | OUTPATIENT
Start: 2024-07-11

## 2024-07-11 RX ORDER — FENTANYL CITRATE 50 UG/ML
50 INJECTION, SOLUTION INTRAMUSCULAR; INTRAVENOUS EVERY 5 MIN PRN
Status: DISCONTINUED | OUTPATIENT
Start: 2024-07-11 | End: 2024-07-11 | Stop reason: HOSPADM

## 2024-07-11 RX ORDER — FUROSEMIDE 20 MG
20 TABLET ORAL DAILY
Status: DISCONTINUED | OUTPATIENT
Start: 2024-07-12 | End: 2024-07-12 | Stop reason: HOSPADM

## 2024-07-11 RX ORDER — FENTANYL CITRATE 50 UG/ML
25 INJECTION, SOLUTION INTRAMUSCULAR; INTRAVENOUS EVERY 5 MIN PRN
Status: DISCONTINUED | OUTPATIENT
Start: 2024-07-11 | End: 2024-07-11 | Stop reason: HOSPADM

## 2024-07-11 RX ORDER — PANTOPRAZOLE SODIUM 40 MG/1
40 TABLET, DELAYED RELEASE ORAL
Status: DISCONTINUED | OUTPATIENT
Start: 2024-07-11 | End: 2024-07-12 | Stop reason: HOSPADM

## 2024-07-11 RX ORDER — SODIUM CHLORIDE, SODIUM LACTATE, POTASSIUM CHLORIDE, CALCIUM CHLORIDE 600; 310; 30; 20 MG/100ML; MG/100ML; MG/100ML; MG/100ML
INJECTION, SOLUTION INTRAVENOUS CONTINUOUS
Status: DISCONTINUED | OUTPATIENT
Start: 2024-07-11 | End: 2024-07-12 | Stop reason: HOSPADM

## 2024-07-11 RX ORDER — CEFAZOLIN SODIUM 2 G/100ML
2 INJECTION, SOLUTION INTRAVENOUS EVERY 8 HOURS
Status: COMPLETED | OUTPATIENT
Start: 2024-07-11 | End: 2024-07-12

## 2024-07-11 RX ORDER — ACETAMINOPHEN 325 MG/1
975 TABLET ORAL ONCE
Status: COMPLETED | OUTPATIENT
Start: 2024-07-11 | End: 2024-07-11

## 2024-07-11 RX ORDER — ONDANSETRON 4 MG/1
4 TABLET, ORALLY DISINTEGRATING ORAL EVERY 30 MIN PRN
Status: DISCONTINUED | OUTPATIENT
Start: 2024-07-11 | End: 2024-07-11 | Stop reason: HOSPADM

## 2024-07-11 RX ORDER — ONDANSETRON 2 MG/ML
4 INJECTION INTRAMUSCULAR; INTRAVENOUS EVERY 6 HOURS PRN
Status: DISCONTINUED | OUTPATIENT
Start: 2024-07-11 | End: 2024-07-12 | Stop reason: HOSPADM

## 2024-07-11 RX ORDER — NALOXONE HYDROCHLORIDE 0.4 MG/ML
0.1 INJECTION, SOLUTION INTRAMUSCULAR; INTRAVENOUS; SUBCUTANEOUS
Status: CANCELLED | OUTPATIENT
Start: 2024-07-11

## 2024-07-11 RX ORDER — AMPICILLIN TRIHYDRATE 250 MG
1 CAPSULE ORAL DAILY
COMMUNITY

## 2024-07-11 RX ORDER — LIDOCAINE 40 MG/G
CREAM TOPICAL
Status: DISCONTINUED | OUTPATIENT
Start: 2024-07-11 | End: 2024-07-11 | Stop reason: HOSPADM

## 2024-07-11 RX ORDER — HYDROMORPHONE HCL IN WATER/PF 6 MG/30 ML
0.4 PATIENT CONTROLLED ANALGESIA SYRINGE INTRAVENOUS EVERY 5 MIN PRN
Status: DISCONTINUED | OUTPATIENT
Start: 2024-07-11 | End: 2024-07-11 | Stop reason: HOSPADM

## 2024-07-11 RX ORDER — MULTIVITAMIN WITH IRON
250 TABLET ORAL AT BEDTIME
Status: DISCONTINUED | OUTPATIENT
Start: 2024-07-11 | End: 2024-07-11

## 2024-07-11 RX ORDER — LOPERAMIDE HCL 2 MG
2 CAPSULE ORAL 2 TIMES DAILY
COMMUNITY
Start: 2024-06-20

## 2024-07-11 RX ORDER — BISACODYL 10 MG
10 SUPPOSITORY, RECTAL RECTAL DAILY PRN
Status: DISCONTINUED | OUTPATIENT
Start: 2024-07-11 | End: 2024-07-12 | Stop reason: HOSPADM

## 2024-07-11 RX ORDER — HYDROXYZINE HYDROCHLORIDE 10 MG/1
10 TABLET, FILM COATED ORAL EVERY 6 HOURS PRN
Status: DISCONTINUED | OUTPATIENT
Start: 2024-07-11 | End: 2024-07-12 | Stop reason: HOSPADM

## 2024-07-11 RX ORDER — MAGNESIUM HYDROXIDE 1200 MG/15ML
LIQUID ORAL PRN
Status: DISCONTINUED | OUTPATIENT
Start: 2024-07-11 | End: 2024-07-11 | Stop reason: HOSPADM

## 2024-07-11 RX ORDER — ONDANSETRON 2 MG/ML
4 INJECTION INTRAMUSCULAR; INTRAVENOUS EVERY 30 MIN PRN
Status: DISCONTINUED | OUTPATIENT
Start: 2024-07-11 | End: 2024-07-11 | Stop reason: HOSPADM

## 2024-07-11 RX ORDER — ATORVASTATIN CALCIUM 40 MG/1
40 TABLET, FILM COATED ORAL
Status: DISCONTINUED | OUTPATIENT
Start: 2024-07-11 | End: 2024-07-12 | Stop reason: HOSPADM

## 2024-07-11 RX ORDER — GABAPENTIN 400 MG/1
1200 CAPSULE ORAL AT BEDTIME
Status: DISCONTINUED | OUTPATIENT
Start: 2024-07-11 | End: 2024-07-12 | Stop reason: HOSPADM

## 2024-07-11 RX ORDER — CALCIUM CARBONATE 500 MG/1
500 TABLET, CHEWABLE ORAL 4 TIMES DAILY PRN
Status: DISCONTINUED | OUTPATIENT
Start: 2024-07-11 | End: 2024-07-12 | Stop reason: HOSPADM

## 2024-07-11 RX ORDER — GLYCOPYRROLATE 0.2 MG/ML
INJECTION, SOLUTION INTRAMUSCULAR; INTRAVENOUS PRN
Status: DISCONTINUED | OUTPATIENT
Start: 2024-07-11 | End: 2024-07-11

## 2024-07-11 RX ORDER — KETOROLAC TROMETHAMINE 15 MG/ML
15 INJECTION, SOLUTION INTRAMUSCULAR; INTRAVENOUS EVERY 6 HOURS
Status: COMPLETED | OUTPATIENT
Start: 2024-07-11 | End: 2024-07-12

## 2024-07-11 RX ORDER — POLYETHYLENE GLYCOL 3350 17 G/17G
17 POWDER, FOR SOLUTION ORAL DAILY
Status: DISCONTINUED | OUTPATIENT
Start: 2024-07-12 | End: 2024-07-12 | Stop reason: HOSPADM

## 2024-07-11 RX ORDER — PROPOFOL 10 MG/ML
INJECTION, EMULSION INTRAVENOUS CONTINUOUS PRN
Status: DISCONTINUED | OUTPATIENT
Start: 2024-07-11 | End: 2024-07-11

## 2024-07-11 RX ORDER — ACETAMINOPHEN 325 MG/1
975 TABLET ORAL EVERY 8 HOURS
Status: DISCONTINUED | OUTPATIENT
Start: 2024-07-11 | End: 2024-07-12 | Stop reason: HOSPADM

## 2024-07-11 RX ORDER — ACETAMINOPHEN 325 MG/1
650 TABLET ORAL EVERY 4 HOURS PRN
Status: DISCONTINUED | OUTPATIENT
Start: 2024-07-14 | End: 2024-07-12 | Stop reason: HOSPADM

## 2024-07-11 RX ORDER — CEFAZOLIN SODIUM/WATER 2 G/20 ML
2 SYRINGE (ML) INTRAVENOUS
Status: COMPLETED | OUTPATIENT
Start: 2024-07-11 | End: 2024-07-11

## 2024-07-11 RX ORDER — KETOCONAZOLE 20 MG/ML
SHAMPOO TOPICAL DAILY PRN
COMMUNITY
Start: 2023-12-28

## 2024-07-11 RX ORDER — DEXTROSE MONOHYDRATE 25 G/50ML
25-50 INJECTION, SOLUTION INTRAVENOUS
Status: DISCONTINUED | OUTPATIENT
Start: 2024-07-11 | End: 2024-07-12 | Stop reason: HOSPADM

## 2024-07-11 RX ORDER — DEXAMETHASONE SODIUM PHOSPHATE 4 MG/ML
4 INJECTION, SOLUTION INTRA-ARTICULAR; INTRALESIONAL; INTRAMUSCULAR; INTRAVENOUS; SOFT TISSUE
Status: CANCELLED | OUTPATIENT
Start: 2024-07-11

## 2024-07-11 RX ORDER — NALOXONE HYDROCHLORIDE 0.4 MG/ML
0.1 INJECTION, SOLUTION INTRAMUSCULAR; INTRAVENOUS; SUBCUTANEOUS
Status: DISCONTINUED | OUTPATIENT
Start: 2024-07-11 | End: 2024-07-11 | Stop reason: HOSPADM

## 2024-07-11 RX ORDER — NALOXONE HYDROCHLORIDE 0.4 MG/ML
0.4 INJECTION, SOLUTION INTRAMUSCULAR; INTRAVENOUS; SUBCUTANEOUS
Status: DISCONTINUED | OUTPATIENT
Start: 2024-07-11 | End: 2024-07-12 | Stop reason: HOSPADM

## 2024-07-11 RX ORDER — ONDANSETRON 4 MG/1
4 TABLET, ORALLY DISINTEGRATING ORAL EVERY 6 HOURS PRN
Status: DISCONTINUED | OUTPATIENT
Start: 2024-07-11 | End: 2024-07-12 | Stop reason: HOSPADM

## 2024-07-11 RX ORDER — LOPERAMIDE HCL 2 MG
2 CAPSULE ORAL 2 TIMES DAILY
Status: DISCONTINUED | OUTPATIENT
Start: 2024-07-11 | End: 2024-07-12 | Stop reason: HOSPADM

## 2024-07-11 RX ORDER — METOPROLOL TARTRATE 100 MG
100 TABLET ORAL 2 TIMES DAILY
Status: DISCONTINUED | OUTPATIENT
Start: 2024-07-11 | End: 2024-07-12 | Stop reason: HOSPADM

## 2024-07-11 RX ORDER — PROCHLORPERAZINE MALEATE 5 MG
5 TABLET ORAL EVERY 6 HOURS PRN
Status: DISCONTINUED | OUTPATIENT
Start: 2024-07-11 | End: 2024-07-12 | Stop reason: HOSPADM

## 2024-07-11 RX ORDER — HYDROMORPHONE HCL IN WATER/PF 6 MG/30 ML
0.4 PATIENT CONTROLLED ANALGESIA SYRINGE INTRAVENOUS
Status: DISCONTINUED | OUTPATIENT
Start: 2024-07-11 | End: 2024-07-12 | Stop reason: HOSPADM

## 2024-07-11 RX ORDER — DEXAMETHASONE SODIUM PHOSPHATE 4 MG/ML
4 INJECTION, SOLUTION INTRA-ARTICULAR; INTRALESIONAL; INTRAMUSCULAR; INTRAVENOUS; SOFT TISSUE
Status: DISCONTINUED | OUTPATIENT
Start: 2024-07-11 | End: 2024-07-11 | Stop reason: HOSPADM

## 2024-07-11 RX ORDER — ASPIRIN 81 MG/1
81 TABLET ORAL 2 TIMES DAILY
Status: DISCONTINUED | OUTPATIENT
Start: 2024-07-11 | End: 2024-07-12 | Stop reason: HOSPADM

## 2024-07-11 RX ORDER — SODIUM CHLORIDE, SODIUM LACTATE, POTASSIUM CHLORIDE, CALCIUM CHLORIDE 600; 310; 30; 20 MG/100ML; MG/100ML; MG/100ML; MG/100ML
INJECTION, SOLUTION INTRAVENOUS CONTINUOUS
Status: DISCONTINUED | OUTPATIENT
Start: 2024-07-11 | End: 2024-07-11 | Stop reason: HOSPADM

## 2024-07-11 RX ORDER — OXYCODONE HYDROCHLORIDE 5 MG/1
5 TABLET ORAL
Status: CANCELLED | OUTPATIENT
Start: 2024-07-11

## 2024-07-11 RX ORDER — OXYCODONE HYDROCHLORIDE 5 MG/1
10 TABLET ORAL EVERY 4 HOURS PRN
Status: DISCONTINUED | OUTPATIENT
Start: 2024-07-11 | End: 2024-07-12 | Stop reason: HOSPADM

## 2024-07-11 RX ORDER — AMLODIPINE BESYLATE 2.5 MG/1
2.5 TABLET ORAL DAILY
Status: DISCONTINUED | OUTPATIENT
Start: 2024-07-12 | End: 2024-07-12 | Stop reason: HOSPADM

## 2024-07-11 RX ORDER — HYDROMORPHONE HCL IN WATER/PF 6 MG/30 ML
0.2 PATIENT CONTROLLED ANALGESIA SYRINGE INTRAVENOUS EVERY 5 MIN PRN
Status: DISCONTINUED | OUTPATIENT
Start: 2024-07-11 | End: 2024-07-11 | Stop reason: HOSPADM

## 2024-07-11 RX ORDER — LIDOCAINE 40 MG/G
CREAM TOPICAL
Status: DISCONTINUED | OUTPATIENT
Start: 2024-07-11 | End: 2024-07-12 | Stop reason: HOSPADM

## 2024-07-11 RX ORDER — AMOXICILLIN 250 MG
1 CAPSULE ORAL 2 TIMES DAILY
Status: DISCONTINUED | OUTPATIENT
Start: 2024-07-11 | End: 2024-07-12 | Stop reason: HOSPADM

## 2024-07-11 RX ORDER — NICOTINE POLACRILEX 4 MG
15-30 LOZENGE BUCCAL
Status: DISCONTINUED | OUTPATIENT
Start: 2024-07-11 | End: 2024-07-12 | Stop reason: HOSPADM

## 2024-07-11 RX ORDER — GABAPENTIN 600 MG/1
900 TABLET ORAL 2 TIMES DAILY
COMMUNITY

## 2024-07-11 RX ADMIN — METOPROLOL TARTRATE 100 MG: 100 TABLET, FILM COATED ORAL at 22:03

## 2024-07-11 RX ADMIN — ATORVASTATIN CALCIUM 40 MG: 40 TABLET, FILM COATED ORAL at 17:11

## 2024-07-11 RX ADMIN — CEFAZOLIN SODIUM 2 G: 2 INJECTION, SOLUTION INTRAVENOUS at 22:03

## 2024-07-11 RX ADMIN — INSULIN ASPART 1 UNITS: 100 INJECTION, SOLUTION INTRAVENOUS; SUBCUTANEOUS at 17:49

## 2024-07-11 RX ADMIN — ASPIRIN 81 MG: 81 TABLET, COATED ORAL at 22:03

## 2024-07-11 RX ADMIN — SENNOSIDES AND DOCUSATE SODIUM 1 TABLET: 50; 8.6 TABLET ORAL at 22:02

## 2024-07-11 RX ADMIN — ACETAMINOPHEN 975 MG: 325 TABLET ORAL at 09:41

## 2024-07-11 RX ADMIN — GABAPENTIN 1200 MG: 400 CAPSULE ORAL at 22:29

## 2024-07-11 RX ADMIN — GLYCOPYRROLATE 0.2 MG: 0.2 INJECTION INTRAMUSCULAR; INTRAVENOUS at 11:11

## 2024-07-11 RX ADMIN — PHENYLEPHRINE HYDROCHLORIDE 100 MCG: 10 INJECTION INTRAVENOUS at 12:09

## 2024-07-11 RX ADMIN — LISINOPRIL 40 MG: 40 TABLET ORAL at 22:02

## 2024-07-11 RX ADMIN — SODIUM CHLORIDE, POTASSIUM CHLORIDE, SODIUM LACTATE AND CALCIUM CHLORIDE: 600; 310; 30; 20 INJECTION, SOLUTION INTRAVENOUS at 09:50

## 2024-07-11 RX ADMIN — Medication 2 G: at 11:08

## 2024-07-11 RX ADMIN — ONDANSETRON 4 MG: 2 INJECTION INTRAMUSCULAR; INTRAVENOUS at 12:53

## 2024-07-11 RX ADMIN — METHOCARBAMOL 500 MG: 500 TABLET ORAL at 17:10

## 2024-07-11 RX ADMIN — KETOROLAC TROMETHAMINE 15 MG: 15 INJECTION, SOLUTION INTRAMUSCULAR; INTRAVENOUS at 17:19

## 2024-07-11 RX ADMIN — ACETAMINOPHEN 975 MG: 325 TABLET ORAL at 17:11

## 2024-07-11 RX ADMIN — MIDAZOLAM 2 MG: 1 INJECTION INTRAMUSCULAR; INTRAVENOUS at 11:08

## 2024-07-11 RX ADMIN — PROPOFOL 65 MCG/KG/MIN: 10 INJECTION, EMULSION INTRAVENOUS at 11:23

## 2024-07-11 RX ADMIN — PANTOPRAZOLE SODIUM 40 MG: 40 TABLET, DELAYED RELEASE ORAL at 17:11

## 2024-07-11 RX ADMIN — METFORMIN HYDROCHLORIDE 2000 MG: 500 TABLET, EXTENDED RELEASE ORAL at 17:11

## 2024-07-11 RX ADMIN — PHENYLEPHRINE HYDROCHLORIDE 100 MCG: 10 INJECTION INTRAVENOUS at 11:48

## 2024-07-11 RX ADMIN — Medication 2.5 MEQ: at 22:03

## 2024-07-11 RX ADMIN — SODIUM CHLORIDE, POTASSIUM CHLORIDE, SODIUM LACTATE AND CALCIUM CHLORIDE: 600; 310; 30; 20 INJECTION, SOLUTION INTRAVENOUS at 13:20

## 2024-07-11 RX ADMIN — PHENYLEPHRINE HYDROCHLORIDE 0.1 MCG/KG/MIN: 10 INJECTION INTRAVENOUS at 11:36

## 2024-07-11 RX ADMIN — MEPIVACAINE HYDROCHLORIDE 3 ML: 15 INJECTION, SOLUTION EPIDURAL; INFILTRATION at 11:11

## 2024-07-11 RX ADMIN — PHENYLEPHRINE HYDROCHLORIDE 100 MCG: 10 INJECTION INTRAVENOUS at 11:39

## 2024-07-11 RX ADMIN — HYDROMORPHONE HYDROCHLORIDE 0.5 MG: 1 INJECTION, SOLUTION INTRAMUSCULAR; INTRAVENOUS; SUBCUTANEOUS at 13:05

## 2024-07-11 RX ADMIN — TRANEXAMIC ACID 1950 MG: 650 TABLET ORAL at 09:41

## 2024-07-11 RX ADMIN — PROPOFOL 30 MG: 10 INJECTION, EMULSION INTRAVENOUS at 11:14

## 2024-07-11 RX ADMIN — HYDROMORPHONE HYDROCHLORIDE 0.5 MG: 1 INJECTION, SOLUTION INTRAMUSCULAR; INTRAVENOUS; SUBCUTANEOUS at 13:26

## 2024-07-11 RX ADMIN — OXYCODONE 10 MG: 5 TABLET ORAL at 17:11

## 2024-07-11 RX ADMIN — Medication 200 MG: at 22:02

## 2024-07-11 ASSESSMENT — ACTIVITIES OF DAILY LIVING (ADL)
ADLS_ACUITY_SCORE: 27
ADLS_ACUITY_SCORE: 27
ADLS_ACUITY_SCORE: 24
ADLS_ACUITY_SCORE: 27
ADLS_ACUITY_SCORE: 27
ADLS_ACUITY_SCORE: 24
ADLS_ACUITY_SCORE: 26
ADLS_ACUITY_SCORE: 32
ADLS_ACUITY_SCORE: 32
ADLS_ACUITY_SCORE: 27
ADLS_ACUITY_SCORE: 27
ADLS_ACUITY_SCORE: 24
ADLS_ACUITY_SCORE: 27
ADLS_ACUITY_SCORE: 24

## 2024-07-11 ASSESSMENT — ENCOUNTER SYMPTOMS
SHORTNESS OF BREATH: 0
CHILLS: 0
ORTHOPNEA: 0
VOMITING: 0
PALPITATIONS: 0
SEIZURES: 0
HEARTBURN: 0
DIZZINESS: 0
DEPRESSION: 0
BLURRED VISION: 0
FLANK PAIN: 0
WHEEZING: 0
NAUSEA: 0
ABDOMINAL PAIN: 0
WEIGHT LOSS: 0
WEAKNESS: 0
FEVER: 0
COUGH: 0
FREQUENCY: 0
TINGLING: 0

## 2024-07-11 NOTE — PHARMACY-ADMISSION MEDICATION HISTORY
Pharmacist BAKARI Medication History    Admission medication history is complete. The information provided in this note is only as accurate as the sources available at the time of the update.    Medication reconciliation/reorder completed by provider prior to medication history? No    Information Source(s): Patient and Clinic records and Care Everywhere via in-person    Pertinent Information: N/A    Allergies reviewed with patient and updates made in EHR: yes    Medications available for use during hospital stay: None.      Medication History Completed By: Gulshan Felder [facility] 7/11/2024 10:12 AM    PTA Med List   Medication Sig Note Last Dose    acetaminophen (TYLENOL) 325 MG tablet Take 325-650 mg by mouth every 4 hours as needed for mild pain  Unknown    amLODIPine (NORVASC) 2.5 MG tablet Take 2.5 mg by mouth daily  7/10/2024 at AM    aspirin 81 MG EC tablet Take 81 mg by mouth 2 times daily 7/9/2024: LD 7/8 7/10/2024 at AM    atorvastatin (LIPITOR) 40 MG tablet Take 40 mg by mouth daily (with dinner)  7/10/2024 at 1800    calcium carbonate-vitamin D (OSCAL W/D) 500-200 MG-UNIT tablet Take 1 tablet by mouth every evening  7/7/2024 at PM    cinnamon 500 MG CAPS Take 1 capsule by mouth daily  7/7/2024    Dulaglutide (TRULICITY) 4.5 MG/0.5ML SOPN Inject 4.5 mg subcutaneously once a week Sundays  6/30/2024    furosemide (LASIX) 20 MG tablet Take 20 mg by mouth daily  7/10/2024 at AM    gabapentin (NEURONTIN) 600 MG tablet Take 900 mg by mouth 2 times daily Morning and Evening, 1200 mg at bedtime  7/11/2024 at AM x1    gabapentin (NEURONTIN) 600 MG tablet Take 1,200 mg by mouth at bedtime  7/10/2024 at PM    HERBALS Take 1 each by mouth daily Beet supplement  Stopping 7/4/24 before surgery  7/3/2024    ketoconazole (NIZORAL) 2 % external shampoo Apply topically daily as needed for itching or irritation  Unknown    lisinopril (ZESTRIL) 40 MG tablet Take 40 mg by mouth At Bedtime   7/9/2024 at PM    loperamide (IMODIUM)  2 MG capsule Take 2 mg by mouth 2 times daily  7/11/2024 at AM    magnesium 250 MG tablet Take 1 tablet by mouth at bedtime  7/9/2024 at PM    metFORMIN (GLUCOPHAGE XR) 500 MG 24 hr tablet Take 2,000 mg by mouth daily (with dinner)  7/9/2024 at 1800    metoprolol tartrate (LOPRESSOR) 50 MG tablet Take 100 mg by mouth 2 times daily   7/11/2024 at AM    multivitamin (CENTRUM SILVER) tablet Take 1 tablet by mouth daily  7/4/2024    omeprazole (PRILOSEC) 40 MG DR capsule Take 40 mg by mouth daily (with dinner)  7/10/2024 at PM    oxyCODONE (ROXICODONE) 5 MG tablet Take 5 mg by mouth 2 times daily as needed for severe pain  7/10/2024 at PM    potassium 99 MG TABS Take 1 tablet by mouth every evening  7/10/2024 at PM    triamcinolone (KENALOG) 0.1 % external cream Apply topically 2 times daily as needed for irritation  Unknown

## 2024-07-11 NOTE — ANESTHESIA PROCEDURE NOTES
"Intrathecal Procedure Note    Pre-Procedure   Staff -        Anesthesiologist:  Luis Ho MD       Performed By: anesthesiologist       Location: OR       Procedure Start/Stop Times: 7/11/2024 11:11 AM and 7/11/2024 11:15 AM       Pre-Anesthestic Checklist: patient identified, IV checked, risks and benefits discussed, informed consent, monitors and equipment checked, pre-op evaluation and at physician/surgeon's request  Timeout:       Correct Patient: Yes        Correct Procedure: Yes        Correct Site: Yes        Correct Position: Yes   Procedure Documentation  Procedure: intrathecal       Patient Position: sitting       Skin prep: Chloraprep       Insertion Site: L3-4. (midline approach).       Needle Gauge: 24.        Needle Length (Inches): 4        Spinal Needle Type: Pencan       Introducer used       Introducer: 20 G       # of attempts: 1 and  # of redirects:     Assessment/Narrative         Paresthesias: No.       CSF fluid: clear.    Medication(s) Administered   1.5% Mepivacaine PF (Intrathecal) - Intrathecal   3 mL - 7/11/2024 11:11:00 AM  Medication Administration Time: 7/11/2024 11:11 AM      FOR [facility] ([facility]) ONLY:   Pain Team Contact information: please page the Pain Team [address]. Search \"Pain\". During daytime hours, please page the attending first. At night please page the resident first.      "

## 2024-07-11 NOTE — TREATMENT PLAN
Orthopedic Surgery Pre-Op Plan: Germán Villanueva  pre-op review. This is NOT an H&P   Surgeon: Dr. Seo    [facility]: [facility]  Name of Surgery: Right Total Hip Arthroplasty   Date of Surgery: 7/11/24  H&P: Completed on 6/25/24 by Anupama Mo NP at [facility].   History of ASA, NSAIDS, vitamin and/or herbal supplements, GLP-1 Agonist or SGLT Inhibitor medication taken within 10 days?: Yes: On dulaglutide (Trulicity)-patient instructed to hold dulaglutide for 7 days before surgery (take last dose on 7/1/2024, then hold).  On ASA 81 mg daily for history of CVA: Will stay on ASA for surgery as recommended by PCP.  Cinnamon bark, herbals, multivitamins: Patient instructed to hold these supplements and vitamins for 7 days before surgery.  History of blood thinners?: No    Plan:   1) Discharge Plan: Home on POD 1 in the morning with her  helping her. Please see Discharge Planning section near bottom of this note for further details.     2) Lesion on Left Leg: Per PCP: Had reddened area approximately 1 inch in diameter on anterior left lower leg.  PCP prescribed triamcinolone ointment.  It lesion does not improve on triamcinolone, PCP wants patient to follow-up. May consider biopsy of area.     3) History of CVA: On secondary prevention with ASA 81 mg daily and statin.  PCP recommended that patient stay on ASA 81 mg through surgery.     4) Hyperlipidemia: On atorvastatin.    5) Hypertension: Well-controlled on amlodipine, lisinopril, metoprolol, and furosemide.  Patient was instructed to hold lisinopril and furosemide on the morning of surgery but to continue taking amlodipine and metoprolol.    6) Obstructive Sleep Apnea: On CPAP.  Patient reminded to bring CPAP machine to the hospital and use it whenever sleeping or napping.    7) Type 2 Diabetes Mellitus: Fair but improved control.  Hemoglobin A1c 7.6 on 6/25/2024.  Blood glucose 110 on 6/25/2024.  On metformin and  dulaglutide (Trulicity).  Not on insulin.  Patient was instructed to hold Trulicity for 7 days before surgery. I recommend blood glucose checks at least three times a day and at bedtime during hospital stay. Goal BG < 180 to decrease risk for infection and wound healing complications. Nursing to please notify Hospitalist if BG > 180.      8) GERD: On omeprazole.    Patient appears medically optimized for upcoming surgery. I would recommend Hospitalist Consult to assist with medical management. Please call me below with any questions on this patient.       Review of Systems Notable for: Lesion on left leg, history of CVA, hyperlipidemia, hypertension, obstructive sleep apnea-on CPAP, type 2 diabetes mellitus-fair control, GERD.     Past Medical History:   Past Medical History:   Diagnosis Date    Arthritis     generalized    Cerebral artery occlusion with cerebral infarction (H) 10/2022    Diabetes (H)     Eczema     Gastroesophageal reflux disease     Silent reflux with throat inflammation    Hypertension     Implantable loop recorder present     Obese     Other chronic pain     neck, back, right hip, left knee    Rosacea     Sleep apnea     cpap     Past Surgical History:   Procedure Laterality Date    ARTHROPLASTY KNEE Left 08/13/2021    Procedure: Left total knee arthroplasty;  Surgeon: Phillip Dozier MD;  Location: [facility] OR    BACK SURGERY  2010    L4-5 fusion    CARPAL TUNNEL RELEASE RT/LT Left 06/28/2022    CERVICAL FUSION  09/23/2014    C3-C7    COLONOSCOPY      DILATION AND CURETTAGE      TOOTH EXTRACTION  02/2024    WISDOM TOOTH EXTRACTION         Current Medications:  Patient's Medications   New Prescriptions    No medications on file   Previous Medications    ACETAMINOPHEN (TYLENOL) 325 MG TABLET    Take 325-650 mg by mouth every 4 hours as needed for mild pain    AMLODIPINE (NORVASC) 2.5 MG TABLET    Take 2.5 mg by mouth daily    ASPIRIN 81 MG EC TABLET    Take 81 mg by mouth 2 times daily     ATORVASTATIN (LIPITOR) 40 MG TABLET    Take 40 mg by mouth daily    CALCIUM CARBONATE-VITAMIN D (OSCAL W/D) 500-200 MG-UNIT TABLET    Take 1 tablet by mouth daily    CINNAMON BARK POWD    Take 500 mg by mouth daily Stopping 24 before surgery    DULAGLUTIDE (TRULICITY) 4.5 MG/0.5ML SOPN    Inject 4.5 mg Subcutaneous once a week Last dose 24 before surgery    FUROSEMIDE (LASIX) 20 MG TABLET    Take 20 mg by mouth daily    GABAPENTIN (NEURONTIN) 600 MG TABLET    Take 1,200 mg by mouth at bedtime    HERBALS    Take 1 each by mouth daily Beet supplement  Stopping 24 before surgery    LISINOPRIL (ZESTRIL) 40 MG TABLET    Take 40 mg by mouth At Bedtime     LOPERAMIDE (IMODIUM A-D) 2 MG TABLET    Take 2 mg by mouth 2 times daily    MAGNESIUM 250 MG TABLET    Take 1 tablet by mouth daily    METFORMIN (GLUCOPHAGE XR) 500 MG 24 HR TABLET    Take 2,000 mg by mouth daily (with dinner)    METOPROLOL TARTRATE (LOPRESSOR) 50 MG TABLET    Take 100 mg by mouth 2 times daily     MULTIVITAMIN (CENTRUM SILVER) TABLET    Take 1 tablet by mouth daily Stopping 24 before surgery    OMEPRAZOLE (PRILOSEC) 40 MG DR CAPSULE    Take 40 mg by mouth daily    OXYCODONE (ROXICODONE) 5 MG TABLET    Take 5 mg by mouth 2 times daily as needed for severe pain    POTASSIUM 99 MG TABS    Take 1 tablet by mouth daily    TRIAMCINOLONE (KENALOG) 0.1 % EXTERNAL CREAM    Apply topically 2 times daily as needed for irritation   Modified Medications    No medications on file   Discontinued Medications    GABAPENTIN (NEURONTIN) 600 MG TABLET    Take 900 mg by mouth 2 times daily At AM and Evening       ALLERGIES:  Allergies   Allergen Reactions    Nickel Rash       Social History  Social History     Tobacco Use    Smoking status: Former     Current packs/day: 0.00     Types: Cigarettes     Quit date: 2001     Years since quittin.4    Smokeless tobacco: Never   Substance Use Topics    Alcohol use: Yes     Comment: 1-2 glasses wine/month     Drug use: Never       Any Abnormal Recent Diagnostics? Yes  Hemoglobin A1c 7.6 on 6/25/2024: Shows fair recent control of type 2 diabetes on metformin and dulaglutide.  Blood glucose 110 on 6/25/2024: We will monitor BG's closely during hospital stay.  Goal BG <180.     Discharge Planning:   Planning to discharge home on POD 1 in the morning with her  helping her.       07/03/24 1343   Discharge Planning   Patient/Family Anticipates Transition to home with family   Concerns to be Addressed all concerns addressed in this encounter   Living Arrangements   People in Home spouse;grandchild(osmin);child(osmin), adult   Type of Residence Private Residence   Is your private residence a single family home or apartment? Single family home   Number of Stairs, Within Home, Primary none  (4 exterior)   Once home, are you able to live on one level? Yes   Which level? Main Level   Bathroom Shower/Tub Tub/Shower unit   Equipment Currently Used at Home cane, straight;grab bar, tub/shower;raised toilet seat  (Has a 4 wheeled walker at home)   Support System   Support Systems Spouse/Significant Other  (, Balwinder)   Do you have someone available to stay with you one or two nights once you are home? Yes       PARISH Gandhi, CNP   Advanced Practice Nurse Navigator- Orthopedics  [facility]   Phone: [phone]

## 2024-07-11 NOTE — CONSULTS
[facility]  Consult Note - Hospitalist Service  Date of Admission:  7/11/2024  Consult Requested by: Orthopedist Dr. Seo  Reason for Consult: Medication management    Assessment & Plan   Germán Villanueva is a 68 year old female admitted on 7/11/2024. She has a PMH of embolic stroke post thrombolytic 2022, HTN, HLD, BRONWYN, DM type II, GERD who is here for right total hip arthroplasty    The preop visit is reviewed.  Preop labs from 6/25 showing hemoglobin 12.5, hemoglobin A1c 7.6%, BMP showing BUN to creatinine ratio 28, EGFR 76.  Preop glucose today was 163. home medications reviewed.  Thank you for the consultation, we will continue to follow    Right hip primary OA s/p right total hip arthroplasty  -complications: none   -EBL: 150 cc  -spinal anesthesia   -DVT prophylaxis and pain management per primary service being orthopedist  -Close monitoring for respiratory concerns, urine retention, ileus, skin reactions, medication side effects etc.  -Incentive spirometry  -Discharge per primary service  - fall precautions, PT/OT     Embolic CVA post thrombolytic in 2022  -No acute focal deficits postoperatively  -Resume PTA statin and ASA 81 mg BID resumed per primary team    DM type II  -Hemoglobin A1c 7.6% in June 2024  -PTA metformin 2,000 mg daily resumed, Trulicity once weekly held  -ACHS Accu-Cheks and sliding scale insulin initiated for optimal postoperative blood glucose control    HTN  Unspecified bilateral lower extremity and bilateral hand edema  -PTA amlodipine 2.5 mg, lisinopril 40 mg, metoprolol 100 mg twice daily, Lasix 20 mg daily resumed with holding parameters  -No history of CAD, denies CP   -Review of preop EKG showing normal sinus rhythm, left ventricular hypertrophy and left atrial enlargement consistent with echocardiogram from  -echo reviewed showing LVEF 55 to 60%, borderline increased left ventricular wall thickness.  No regional wall motion abnormalities.  Mild left  "atrial enlargement.  Mild tricuspid valve regurgitation.    BRONWYN  -Resume home CPAP    GERD  -PTA omeprazole resumed       The patient's care was discussed with the Attending Physician, Dr. Jay and the pt .    Clinically Significant Risk Factors Present on Admission                # Drug Induced Platelet Defect: home medication list includes an antiplatelet medication   # Hypertension: Home medication list includes antihypertensive(s)             # Obesity: Estimated body mass index is 33.45 kg/m  as calculated from the following:    Height as of this encounter: 1.651 m (5' 5\").    Weight as of this encounter: 91.2 kg (201 lb).              Triny Austin PA-C  Hospitalist Service  Securely message with [employer] (more info)  Text page via [facility] Paging/Directory   ______________________________________________________________________    Chief Complaint   Right hip pain    History is obtained from the patient    History of Present Illness   Germán Villanueva is a 68 year old female who has a PMH of embolic stroke post thrombolytic in 2022, HTN, HLD, BRONWYN, DM type II, GERD who is here for right total hip arthroplasty. The preop visit is reviewed.  Preop labs from 6/25 showing hemoglobin 12.5, hemoglobin A1c 7.6%, BMP showing BUN to creatinine ratio 28, EGFR 76.  Preop glucose today was 163 home medications reviewed.     Patient was seen postoperatively in room 356.  Vital signs stable, patient is on room air, in no acute distress endorsing no acute concerns or complaints, he was denying chest pain, shortness of breath, abdominal pain, patient changes, headaches weakness or decrease sensation.  Reports poststroke in 2022 she experiencing leftward leaning with ambulation, no other deficits such as dysphagia or unilateral paresthesias.  She also denies any other additional blood clots before or after the stroke in 2022.  She was rating her left hip pain 7/10. We discussed home medications at length. pain management per " primary team as well as DVT prophylaxis.     Past Medical History    Past Medical History:   Diagnosis Date    Arthritis     generalized    Cerebral artery occlusion with cerebral infarction (H) 10/2022    Diabetes (H)     Eczema     Gastroesophageal reflux disease     Silent reflux with throat inflammation    Hypertension     Implantable loop recorder present     Obese     Other chronic pain     neck, back, right hip, left knee    Rosacea     Sleep apnea     cpap       Past Surgical History   Past Surgical History:   Procedure Laterality Date    ARTHROPLASTY KNEE Left 08/13/2021    Procedure: Left total knee arthroplasty;  Surgeon: Phillip Dozier MD;  Location: [facility] OR    BACK SURGERY  2010    L4-5 fusion    CARPAL TUNNEL RELEASE RT/LT Left 06/28/2022    CERVICAL FUSION  09/23/2014    C3-C7    COLONOSCOPY      DILATION AND CURETTAGE      TOOTH EXTRACTION  02/2024    WISDOM TOOTH EXTRACTION         Medications   I have reviewed this patient's current medications       Review of Systems    Review of Systems   Constitutional:  Negative for chills, fever and weight loss.   Eyes:  Negative for blurred vision.   Respiratory:  Negative for cough, shortness of breath and wheezing.    Cardiovascular:  Negative for chest pain, palpitations and orthopnea.   Gastrointestinal:  Negative for abdominal pain, heartburn, nausea and vomiting.   Genitourinary:  Negative for flank pain, frequency and urgency.   Musculoskeletal:         Left hip pain   Skin:  Negative for itching and rash.   Neurological:  Negative for dizziness, tingling, seizures and weakness.   Psychiatric/Behavioral:  Negative for depression.           Physical Exam   Vital Signs: Temp: 97.2  F (36.2  C) Temp src: Oral BP: (!) 163/75 Pulse: 67   Resp: 15 SpO2: 92 % O2 Device: Nasal cannula Oxygen Delivery: 1 LPM  Weight: 201 lbs 0 oz    Physical Exam  Constitutional:       General: She is not in acute distress.     Appearance: She is obese. She is not  ill-appearing, toxic-appearing or diaphoretic.   HENT:      Head: Normocephalic and atraumatic.      Mouth/Throat:      Mouth: Mucous membranes are dry.   Cardiovascular:      Rate and Rhythm: Normal rate and regular rhythm.      Heart sounds: No murmur heard.     No friction rub. No gallop.   Pulmonary:      Effort: No respiratory distress.      Breath sounds: Examination of the right-lower field reveals rales. Examination of the left-lower field reveals rales. Rales present. No wheezing.   Abdominal:      General: Bowel sounds are normal.      Palpations: Abdomen is soft.   Musculoskeletal:      Comments: Bilateral compartments soft and warm.  2+ DP pulses bilaterally.  Moving bilateral feet   Neurological:      General: No focal deficit present.      Mental Status: She is alert and oriented to person, place, and time.      Cranial Nerves: No cranial nerve deficit or facial asymmetry.      Sensory: Sensation is intact.      Motor: No weakness or tremor.      Comments: Sensation intact to bilateral upper and lower extremities.  No slurred speech, no facial droop.   Psychiatric:         Mood and Affect: Mood normal.         Behavior: Behavior normal.           Medical Decision Making       60 MINUTES SPENT BY ME on the date of service doing chart review, history, exam, documentation & further activities per the note.      Data         Imaging results reviewed over the past 24 hrs:   Recent Results (from the past 24 hour(s))   XR Pelvis Port 1/2 Views    Narrative    EXAM: XR PELVIS PORT 1/2 VIEWS  LOCATION: [facility]  DATE: 7/11/2024    INDICATION: Status post Hip surgery  COMPARISON: None.      Impression    IMPRESSION: Status post recent right total hip arthroplasty. No immediate hardware complication. Expected postsurgical soft tissue edema and subcutaneous emphysema. No acute fracture or malalignment.

## 2024-07-11 NOTE — ANESTHESIA POSTPROCEDURE EVALUATION
Patient: Germán Villanueva    Procedure: Procedure(s):  RIGHT TOTAL HIP ARTHROPLASTY       Anesthesia Type:  Spinal    Note:  Disposition: Inpatient; Admission   Postop Pain Control: Uneventful            Sign Out: Well controlled pain   PONV: No   Neuro/Psych: Uneventful            Sign Out: Acceptable/Baseline neuro status   Airway/Respiratory: Uneventful            Sign Out: Acceptable/Baseline resp. status   CV/Hemodynamics: Uneventful            Sign Out: Acceptable CV status; No obvious hypovolemia; No obvious fluid overload   Other NRE: NONE   DID A NON-ROUTINE EVENT OCCUR? No           Last vitals:  Vitals Value Taken Time   /70 07/11/24 1350   Temp 36.2  C (97.1  F) 07/11/24 1322   Pulse 66 07/11/24 1358   Resp 21 07/11/24 1358   SpO2 97 % 07/11/24 1358   Vitals shown include unfiled device data.    Electronically Signed By: Luis Ho MD  July 11, 2024  2:23 PM

## 2024-07-11 NOTE — OP NOTE
"Operative Note    PROCEDURE:  RIGHT MINIMALLY INVASIVE TOTAL HIP ARTHROPLASTY    Pre-Procedure Diagnosis:  Right Hip Primary OA (osteoarthritis) of hip     Post-Procedure Diagnosis:    Right Hip Primary OA(osteoarthritis) of hip    Surgeon(s):  Slava Seo MD    Assist:  Lexx Jean PA-C  A PAC was necessary to ensure safety of this patientand adequate progression of the procedure.    Anesthesia Type:  Spinal    Complications:  None    Condition on discharge from OR:  Stable    Estimated Blood Loss:   150 cc    Specimens:    None    Implants Used:  Smith and Nephew R3 cup (size 56 mm), 40 mm ID/ 56 OD +4mm, 20 degree XLPE liner, Polar stem (size 4 Standard), 40 mm femoral head (+4 mm length), 15 x 6.5 and 25 x 6.5 screws.       Drains:   None    Description:  Patient brought to the operating room and after adequate anesthesia was induced, the left lower extremity wasprepped and draped in the usual sterile fashion while the patient was positioned in the lateral decubitus position.  I went ahead with an MIS incision and proceeded through the skin and subQ and identified the gluteusmaximus fascia.  I opened the fascia in line with the fibers and placed an east, west retractor.  We internally rotated the hip and took down the external rotators.  At this point I performed a \"T\" capsulotomy and saved that capsule for later repair.  I used the Smith and Nephew length, offset device to establish a baseline leg length and then dislocated the hip. Per pre-operative templating, I made a neck cut 14 mm above the lesser trochanter and removed the femoral head which had grade 4 chondromalacia.  The proximal femur was carefully retracted and the acetabular labrum and fovea were excised.  I medialized with a 45 mm reamer to the medial wall and circumferentially reamed up to a 55 mm reamer and then impacted a 56 mm acetabular component.  The 2 screws were placed and then I locked the polyethylene liner.  Next, we turned " attention to the femur.  A cookie cutterand canal finder were used to open the proximal femur.  I lateralized with a lateralizing reamer and then we used sequential broaches up to a 4 broach and it had good fit,fill, and stability.  We trialed both high and standard offset necks, and settled on the standard offset neck due to best soft tissue tensioning.  With the +4 mm X 40 mm trial ball in place, we ran the hip through a range ofmotion.  My assist flexed the hip to 90 degrees and internally rotated to 80 degrees before it was almost dislocated.  It was stable in full extension and maximal external rotation as well- with no impingement.  The offset was slightly changed by about +4 mm, and the length slightly exceeded our goal length (ultimately adding +4-5 mm).  At this point, we removed the broach and placed the final implant. (4 Standard Polar press fit stem).  Once ready, we did a final trial and our stability, offset, and length matched our trial above.  We went ahead and impacted the +4 mm X 40 mm ball on the trunion and reduced the hip.  At this point, I repaired the posterior capsule with 1-0 Vicryl.  I irrigated copiously with pulse lavage.  I then closed the gluteus fascia with #1 Vicryl in an interrupted figure of 8 fashion.  I closed the small opening in the IT band with #1 PDS in an interrupted horizontal mattress fashion.  The subQ was closed with 2.0 vicryl and the skin was closed with 3-0 monocryl in a subcuticular fashion.  Steri-strips and Sterile bandages were applied and the patient was returned to the PACU in excellent condition.    Plan:  WBAT  F/U in 2 weeks  Leave dressing for 10 days  DVT Prophylaxis:  ASA 81 mg PO BID for 40 days.        Slava Seo

## 2024-07-11 NOTE — ANESTHESIA PREPROCEDURE EVALUATION
Anesthesia Pre-Procedure Evaluation    Patient: Germán Villanueva   MRN: [id number] : 1955        Procedure : Procedure(s):  RIGHT TOTAL HIP ARTHROPLASTY          Past Medical History:   Diagnosis Date    Arthritis     generalized    Cerebral artery occlusion with cerebral infarction (H) 10/2022    Diabetes (H)     Eczema     Gastroesophageal reflux disease     Silent reflux with throat inflammation    Hypertension     Implantable loop recorder present     Obese     Other chronic pain     neck, back, right hip, left knee    Rosacea     Sleep apnea     cpap      Past Surgical History:   Procedure Laterality Date    ARTHROPLASTY KNEE Left 2021    Procedure: Left total knee arthroplasty;  Surgeon: Phillip Dozier MD;  Location: [facility] OR    BACK SURGERY      L4-5 fusion    CARPAL TUNNEL RELEASE RT/LT Left 2022    CERVICAL FUSION  2014    C3-C7    COLONOSCOPY      DILATION AND CURETTAGE      TOOTH EXTRACTION  2024    WISDOM TOOTH EXTRACTION        Allergies   Allergen Reactions    Nickel Rash      Social History     Tobacco Use    Smoking status: Former     Current packs/day: 0.00     Types: Cigarettes     Quit date: 2001     Years since quittin.4    Smokeless tobacco: Never   Substance Use Topics    Alcohol use: Yes     Comment: 1-2 glasses wine/month      Wt Readings from Last 1 Encounters:   24 91.2 kg (201 lb)        Anesthesia Evaluation   Pt has had prior anesthetic.     History of anesthetic complications  - PONV.      ROS/MED HX  ENT/Pulmonary:  - neg pulmonary ROS   (+) sleep apnea,                                       Neurologic:  - neg neurologic ROS   (+)          CVA,    TIA,                  Cardiovascular: Comment: Echo  Final Conclusion  1. Normal left ventricular chamber size. Borderline increased left ventricular wall thickness.  Normal left ventricular systolic function.  Calculated left ventricular ejection fraction (modified Reagan technique) is  "60 %. No  regional wall motion abnormalities.  2. Normal right ventricular chamber size. Normal right ventricular systolic function.  Estimated right ventricular systolic pressure is 35  mmHg.  3. Mild left atrial enlargement.  4. Mild tricuspid valve regurgitation.    There were no prior studies available for comparison.    Estimated EF: 55-60%       (+)  hypertension- -   -  - -                                      METS/Exercise Tolerance: >4 METS    Hematologic:  - neg hematologic  ROS     Musculoskeletal:  - neg musculoskeletal ROS     GI/Hepatic:     (+) GERD,                   Renal/Genitourinary:  - neg Renal ROS     Endo:  - neg endo ROS   (+)  type II DM,             Obesity,       Psychiatric/Substance Use:  - neg psychiatric ROS     Infectious Disease:  - neg infectious disease ROS     Malignancy:  - neg malignancy ROS     Other:  - neg other ROS    (+)  , H/O Chronic Pain,         Physical Exam    Airway        Mallampati: II   TM distance: > 3 FB   Neck ROM: full   Mouth opening: > 3 cm    Respiratory Devices and Support         Dental  no notable dental history     (+) Modest Abnormalities - crowns, retainers, 1 or 2 missing teeth      Cardiovascular          Rhythm and rate: regular and normal     Pulmonary           breath sounds clear to auscultation       Other findings: Hb 11.6    OUTSIDE LABS:  CBC:   Lab Results   Component Value Date    HGB 11.6 (L) 08/14/2021    HGB 10.8 (L) 07/10/2010     BMP:   Lab Results   Component Value Date     07/07/2010    POTASSIUM 4.0 07/07/2010    CHLORIDE 104 07/07/2010    CO2 27 07/07/2010    BUN 20 07/07/2010    CR 0.92 08/13/2021    CR 0.66 07/07/2010     (H) 07/11/2024     (H) 08/14/2021     COAGS:   Lab Results   Component Value Date    PTT 30 07/07/2010    INR 0.89 07/07/2010     POC:   Lab Results   Component Value Date     (H) 07/08/2010     HEPATIC: No results found for: \"ALBUMIN\", \"PROTTOTAL\", \"ALT\", \"AST\", \"GGT\", \"ALKPHOS\", " "\"BILITOTAL\", \"BILIDIRECT\", \"GINGER\"  OTHER:   Lab Results   Component Value Date    SCOUT 8.9 07/07/2010       Anesthesia Plan    ASA Status:  3    NPO Status:  NPO Appropriate    Anesthesia Type: Spinal.   Induction: Intravenous, Propofol.   Maintenance: Balanced.        Consents    Anesthesia Plan(s) and associated risks, benefits, and realistic alternatives discussed. Questions answered and patient/representative(s) expressed understanding.     - Discussed: Risks, Benefits and Alternatives for BOTH SEDATION and the PROCEDURE were discussed     - Discussed with:  Patient       - Patient is DNR/DNI Status: No          Postoperative Care    Pain management: IV analgesics, Oral pain medications.   PONV prophylaxis: Ondansetron (or other 5HT-3), Dexamethasone or Solumedrol     Comments:    Other Comments: Spinal anesthesia in OR           Luis Ho MD    I have reviewed the pertinent notes and labs in the chart from the past 30 days and (re)examined the patient.  Any updates or changes from those notes are reflected in this note.             # Drug Induced Platelet Defect: home medication list includes an antiplatelet medication  # Obesity: Estimated body mass index is 33.09 kg/m  as calculated from the following:    Height as of this encounter: 1.66 m (5' 5.35\").    Weight as of this encounter: 91.2 kg (201 lb).      "

## 2024-07-11 NOTE — ANESTHESIA CARE TRANSFER NOTE
Patient: Germán Villanueva    Procedure: Procedure(s):  RIGHT TOTAL HIP ARTHROPLASTY       Diagnosis: Osteoarthritis of right hip [M16.11]  Right hip pain [M25.551]  Diagnosis Additional Information: No value filed.    Anesthesia Type:   Spinal     Note:    Oropharynx: oropharynx clear of all foreign objects  Level of Consciousness: awake  Oxygen Supplementation: face mask  Level of Supplemental Oxygen (L/min / FiO2): 8  Independent Airway: airway patency satisfactory and stable  Dentition: dentition unchanged  Vital Signs Stable: post-procedure vital signs reviewed and stable  Report to RN Given: handoff report given  Patient transferred to: PACU    Handoff Report: Identifed the Patient, Identified the Reponsible Provider, Reviewed the pertinent medical history, Discussed the surgical course, Reviewed Intra-OP anesthesia mangement and issues during anesthesia, Set expectations for post-procedure period and Allowed opportunity for questions and acknowledgement of understanding      Vitals:  Vitals Value Taken Time   /64 07/11/24 1324   Temp 96.8    Pulse 75 07/11/24 1325   Resp 16 07/11/24 1325   SpO2 94 % 07/11/24 1325   Vitals shown include unfiled device data.    Electronically Signed By: PARISH Robledo CRNA  July 11, 2024  1:27 PM

## 2024-07-11 NOTE — PROGRESS NOTES
[facility]  OUTPATIENT PHYSICAL THERAPY EVALUATION  PLAN OF TREATMENT FOR OUTPATIENT REHABILITATION  (COMPLETE FOR INITIAL CLAIMS ONLY)  Patient's Last Name, First Name, M.I.  YOB: 1955  Germán Villanueva                        Provider's Name  [facility] Medical Record No.  [id number]                             Onset Date:  07/11/24   Start of Care Date:  07/11/24   Type:     _X_PT   ___OT   ___SLP Medical Diagnosis:  Primary osteoarthritis of right hip              PT Diagnosis:  impaired functional mobility Visits from SOC:  1     See note for plan of treatment, functional goals and certification details    I CERTIFY THE NEED FOR THESE SERVICES FURNISHED UNDER        THIS PLAN OF TREATMENT AND WHILE UNDER MY CARE     (Physician co-signature of this document indicates review and certification of the therapy plan).                 07/11/24 1620   Appointment Info   Signing Clinician's Name / Credentials (PT) Adelina Cyr, PT, DPT   Quick Adds   Quick Adds Certification   Living Environment   People in Home spouse   Current Living Arrangements house   Home Accessibility stairs to enter home;stairs within home   Number of Stairs, Main Entrance 4   Stair Railings, Main Entrance railings safe and in good condition   Number of Stairs, Within Home, Primary greater than 10 stairs   Stair Railings, Within Home, Primary railings safe and in good condition   Living Environment Comments Pt reports can stay on main level once in house   Self-Care   Equipment Currently Used at Home none   Activity/Exercise/Self-Care Comment Pt reports has walker at home   General Information   Onset of Illness/Injury or Date of Surgery 07/11/24   Referring Physician Slava Seo MD   Patient/Family Therapy Goals Statement (PT) to get better   Pertinent History of Current Problem (include personal factors and/or comorbidities that impact the POC) Primary  osteoarthritis of right hip   Existing Precautions/Restrictions weight bearing;no hip ADD past midline;no hip IR   Weight-Bearing Status - LLE full weight-bearing   Weight-Bearing Status - RLE weight-bearing as tolerated   Bed Mobility   Bed Mobility supine-sit   Supine-Sit [place] (Bed Mobility) supervision;verbal cues   Comment, (Bed Mobility) SBA with supine to sit transfers. Verbal cues for safety and maintaining R hip precautions.   Transfers   Transfers sit-stand transfer   Comment, (Transfers) SBA with FWW for sit<>stand transfers. Verbal cues for safety and safe hand placement.   Sit-Stand Transfer   Sit-Stand [place] (Transfers) supervision;verbal cues   Assistive Device (Sit-Stand Transfers) walker, front-wheeled   Comment, (Sit-Stand Transfer) SBA with FWW for sit<>stand transfers. Verbal cues for safety and safe hand placement.   Gait/Stairs (Locomotion)   [place] Level (Gait) supervision;verbal cues   Assistive Device (Gait) walker, front-wheeled   Distance in Feet (Gait) 10'   Pattern (Gait) step-through   Deviations/Abnormal Patterns (Gait) base of support, wide;teodoro decreased;gait speed decreased   Comment, (Gait/Stairs) Pt ambulates with SBA and FWW. Verbal cues for safety and safe step technique. Pt reports RLE is longer than LLE. Pt reports limp when ambulating.   Clinical Impression   Criteria for Skilled Therapeutic Intervention Yes, treatment indicated   PT Diagnosis (PT) impaired functional mobility   Influenced by the following impairments weakness, pain   Functional limitations due to impairments transfers, ambulation, stairs   Clinical Presentation (PT Evaluation Complexity) stable   Clinical Presentation Rationale Pt presents as medically diagnosed   Clinical Decision Making (Complexity) low complexity   Planned Therapy Interventions (PT) balance training;bed mobility training;gait training;home exercise program;patient/family education;ROM (range of motion);stair  training;strengthening;stretching;transfer training   Risk & Benefits of therapy have been explained evaluation/treatment results reviewed;care plan/treatment goals reviewed;patient   PT Total Evaluation Time   PT Eval, Low Complexity Minutes (49700) 10   Therapy Certification   Start of care date 07/11/24   Certification date from 07/11/24   Certification date to 08/09/24   Medical Diagnosis Primary osteoarthritis of right hip   Physical Therapy Goals   PT Frequency Daily   PT Predicted Duration/Target Date for Goal Attainment 07/13/24   PT Goals Transfers;Gait;Stairs   PT: Transfers Modified independent;Sit to/from stand;Assistive device  (FWW)   PT: Gait Modified independent;Assistive device;Rolling walker;Greater than 200 feet  (FWW)   PT: Stairs Supervision/stand-by assist;4 stairs;Rail on both sides   Interventions   Interventions Quick Adds Gait Training;Therapeutic Activity;Therapeutic Procedure   Therapeutic Procedure/Exercise   Ther. Procedure: strength, endurance, ROM, flexibillity Minutes (04984) 5   Treatment Detail/Skilled Intervention Pt educated on and guided through supine BLE strengthening exercises x 10 each. Verbal cues, tactile cues and assist with correct technique.   Therapeutic Activity   Therapeutic Activities: dynamic activities to improve functional performance Minutes (93157) 10   Treatment Detail/Skilled Intervention SBA with supine to sit transfers. Education on R hip adduction precautions. SBA with FWW for sit<>Stand transfers. Verbal cues for safety and safe hand placement. Verbal cues for safety and safe hand placement. Pt sitting up in chair with pillow between legs to prevent hip adduction with call light within reach.   Gait Training   Gait Training Minutes (94236) 15   Treatment Detail/Skilled Intervention Pt ambulates with SBA and FWW. Verbal cues for safety and posture. Cues for safe step technique. Pt reports that she thinks that her R leg might be longer now than before  surgery. Pt ambulates with limp and demonstrates that her RLE is longer than her LLE.   Distance in Feet 250'   [place] Level (Gait Training) stand-by assist   Physical Assistance Level (Gait Training) supervision;verbal cues   Weight Bearing (Gait Training) weight-bearing as tolerated   Assistive Device (Gait Training) rolling walker   Pattern Analysis (Gait Training) swing-to gait   Gait Analysis Deviations decreased teodoro;decreased step length;increased stride width   Impairments (Gait Analysis/Training) pain   PT Discharge Planning   PT Plan trial stairs, review therex, progress transfers and ambulation   PT Discharge Recommendation (DC Rec) (S)  other (see comments)  (defer to ortho)   PT Rationale for DC Rec Pt reports good home setup and good home support. Pt reports has FWW at home.   PT Brief overview of current status SBA with transfers and 250 feet with FWW   Total Session Time   Timed Code Treatment Minutes 30   Total Session Time (sum of timed and untimed services) 40     Adelina Cyr, PT, DPT

## 2024-07-11 NOTE — PLAN OF CARE
Goal Outcome Evaluation:             Patient vital signs are at baseline: no, BP elevated  Patient able to ambulate as they were prior to admission or with assist devices provided by therapies during their stay:  No,  Reason:  not OOB yet  Patient MUST void prior to discharge:  No,  Reason:  due to void  Patient able to tolerate oral intake:  Yes  Pain has adequate pain control using Oral analgesics:  No,  Reason:  n/a havent given pain meds since pt got to floor.  Does patient have an identified :  Yes  Has goal D/C date and time been discussed with patient:  Yes

## 2024-07-12 ENCOUNTER — APPOINTMENT (OUTPATIENT)
Dept: PHYSICAL THERAPY | Facility: CLINIC | Age: 69
End: 2024-07-12
Attending: ORTHOPAEDIC SURGERY
Payer: COMMERCIAL

## 2024-07-12 ENCOUNTER — APPOINTMENT (OUTPATIENT)
Dept: OCCUPATIONAL THERAPY | Facility: CLINIC | Age: 69
End: 2024-07-12
Attending: ORTHOPAEDIC SURGERY
Payer: COMMERCIAL

## 2024-07-12 VITALS
TEMPERATURE: 98.8 F | RESPIRATION RATE: 16 BRPM | BODY MASS INDEX: 33.49 KG/M2 | OXYGEN SATURATION: 92 % | SYSTOLIC BLOOD PRESSURE: 158 MMHG | DIASTOLIC BLOOD PRESSURE: 76 MMHG | HEIGHT: 65 IN | WEIGHT: 201 LBS | HEART RATE: 68 BPM

## 2024-07-12 LAB
GLUCOSE BLDC GLUCOMTR-MCNC: 143 MG/DL (ref 70–99)
GLUCOSE BLDC GLUCOMTR-MCNC: 186 MG/DL (ref 70–99)
HGB BLD-MCNC: 10.5 G/DL (ref 11.7–15.7)

## 2024-07-12 PROCEDURE — 250N000013 HC RX MED GY IP 250 OP 250 PS 637: Performed by: HOSPITALIST

## 2024-07-12 PROCEDURE — 82962 GLUCOSE BLOOD TEST: CPT

## 2024-07-12 PROCEDURE — 97116 GAIT TRAINING THERAPY: CPT | Mod: GP

## 2024-07-12 PROCEDURE — 250N000013 HC RX MED GY IP 250 OP 250 PS 637: Performed by: ORTHOPAEDIC SURGERY

## 2024-07-12 PROCEDURE — 36415 COLL VENOUS BLD VENIPUNCTURE: CPT | Performed by: ORTHOPAEDIC SURGERY

## 2024-07-12 PROCEDURE — 97530 THERAPEUTIC ACTIVITIES: CPT | Mod: GP

## 2024-07-12 PROCEDURE — 97535 SELF CARE MNGMENT TRAINING: CPT | Mod: GO

## 2024-07-12 PROCEDURE — 99214 OFFICE O/P EST MOD 30 MIN: CPT | Performed by: HOSPITALIST

## 2024-07-12 PROCEDURE — 85018 HEMOGLOBIN: CPT | Performed by: ORTHOPAEDIC SURGERY

## 2024-07-12 PROCEDURE — 97165 OT EVAL LOW COMPLEX 30 MIN: CPT | Mod: GO

## 2024-07-12 PROCEDURE — 250N000011 HC RX IP 250 OP 636: Performed by: ORTHOPAEDIC SURGERY

## 2024-07-12 RX ORDER — AMOXICILLIN 250 MG
1 CAPSULE ORAL 2 TIMES DAILY
Qty: 30 TABLET | Refills: 0 | Status: SHIPPED | OUTPATIENT
Start: 2024-07-12

## 2024-07-12 RX ORDER — HYDROXYZINE HYDROCHLORIDE 10 MG/1
10 TABLET, FILM COATED ORAL EVERY 6 HOURS PRN
Qty: 25 TABLET | Refills: 0 | Status: SHIPPED | OUTPATIENT
Start: 2024-07-12

## 2024-07-12 RX ORDER — OXYCODONE HYDROCHLORIDE 5 MG/1
5 TABLET ORAL 2 TIMES DAILY PRN
Qty: 20 TABLET | Refills: 0 | Status: SHIPPED | OUTPATIENT
Start: 2024-07-12

## 2024-07-12 RX ORDER — METHOCARBAMOL 500 MG/1
500 TABLET, FILM COATED ORAL 3 TIMES DAILY PRN
Qty: 30 TABLET | Refills: 0 | Status: SHIPPED | OUTPATIENT
Start: 2024-07-12

## 2024-07-12 RX ORDER — ASPIRIN 81 MG/1
81 TABLET ORAL 2 TIMES DAILY
Qty: 80 TABLET | Refills: 0 | Status: SHIPPED | OUTPATIENT
Start: 2024-07-12 | End: 2024-08-21

## 2024-07-12 RX ADMIN — OXYCODONE 10 MG: 5 TABLET ORAL at 00:25

## 2024-07-12 RX ADMIN — ACETAMINOPHEN 975 MG: 325 TABLET ORAL at 10:13

## 2024-07-12 RX ADMIN — FUROSEMIDE 20 MG: 20 TABLET ORAL at 10:13

## 2024-07-12 RX ADMIN — OXYCODONE 5 MG: 5 TABLET ORAL at 04:35

## 2024-07-12 RX ADMIN — SENNOSIDES AND DOCUSATE SODIUM 1 TABLET: 50; 8.6 TABLET ORAL at 10:13

## 2024-07-12 RX ADMIN — INSULIN ASPART 1 UNITS: 100 INJECTION, SOLUTION INTRAVENOUS; SUBCUTANEOUS at 06:35

## 2024-07-12 RX ADMIN — ACETAMINOPHEN 975 MG: 325 TABLET ORAL at 02:58

## 2024-07-12 RX ADMIN — ASPIRIN 81 MG: 81 TABLET, COATED ORAL at 10:12

## 2024-07-12 RX ADMIN — HYDROXYZINE HYDROCHLORIDE 10 MG: 10 TABLET ORAL at 03:16

## 2024-07-12 RX ADMIN — INSULIN ASPART 1 UNITS: 100 INJECTION, SOLUTION INTRAVENOUS; SUBCUTANEOUS at 12:29

## 2024-07-12 RX ADMIN — OXYCODONE 5 MG: 5 TABLET ORAL at 10:21

## 2024-07-12 RX ADMIN — KETOROLAC TROMETHAMINE 15 MG: 15 INJECTION, SOLUTION INTRAMUSCULAR; INTRAVENOUS at 12:35

## 2024-07-12 RX ADMIN — KETOROLAC TROMETHAMINE 15 MG: 15 INJECTION, SOLUTION INTRAMUSCULAR; INTRAVENOUS at 06:31

## 2024-07-12 RX ADMIN — AMLODIPINE BESYLATE 2.5 MG: 2.5 TABLET ORAL at 10:12

## 2024-07-12 RX ADMIN — CEFAZOLIN SODIUM 2 G: 2 INJECTION, SOLUTION INTRAVENOUS at 03:16

## 2024-07-12 RX ADMIN — KETOROLAC TROMETHAMINE 15 MG: 15 INJECTION, SOLUTION INTRAMUSCULAR; INTRAVENOUS at 00:21

## 2024-07-12 RX ADMIN — METOPROLOL TARTRATE 100 MG: 100 TABLET, FILM COATED ORAL at 10:13

## 2024-07-12 RX ADMIN — HYDROMORPHONE HYDROCHLORIDE 0.4 MG: 0.2 INJECTION, SOLUTION INTRAMUSCULAR; INTRAVENOUS; SUBCUTANEOUS at 03:16

## 2024-07-12 ASSESSMENT — ACTIVITIES OF DAILY LIVING (ADL)
ADLS_ACUITY_SCORE: 28
ADLS_ACUITY_SCORE: 32
ADLS_ACUITY_SCORE: 28

## 2024-07-12 NOTE — PROGRESS NOTES
07/12/24 0918   Appointment Info   Signing Clinician's Name / Credentials (OT) JASON Felipe   Student Supervision Direct supervision provided;Therapy services provided with the co-signing licensed therapist guiding and directing the services, and providing the skilled judgement and assessment throughout the session   Quick Adds   Quick Adds Certification   Living Environment   People in Home spouse;grandchild(osmin);child(osmin), adult   Current Living Arrangements house   Living Environment Comments tub shower; grab bars, FWW, cane, RTS, lives w/ spouse, son, and grandchildren who can provide support   Self-Care   Usual Activity Tolerance good   Current Activity Tolerance moderate   Equipment Currently Used at Home cane, straight;grab bar, tub/shower;raised toilet seat   Activity/Exercise/Self-Care Comment Pt ind w/ ADLs and IADLs at baseline   General Information   Onset of Illness/Injury or Date of Surgery 07/11/24   Referring Physician Slava Seo MD   Patient/Family Therapy Goal Statement (OT) To go home   Additional Occupational Profile Info/Pertinent History of Current Problem R JANNIE   Existing Precautions/Restrictions no hip IR;no hip ADD past midline   Right Lower Extremity (Weight-bearing Status) weight-bearing as tolerated (WBAT)   Cognitive Status Examination   Orientation Status orientation to person, place and time   Visual Perception   Visual Impairment/Limitations WFL   Sensory   Sensory Quick Adds sensation intact   Pain Assessment   Patient Currently in Pain Yes, see Vital Sign flowsheet   Posture   Posture not impaired   Range of Motion Comprehensive   General Range of Motion no range of motion deficits identified  (Secondary to R JANNIE)   Strength Comprehensive (MMT)   General Manual Muscle Testing (MMT) Assessment no strength deficits identified  (Secondary to R JANNIE)   Coordination   Upper Extremity Coordination No deficits were identified   Bed Mobility   Bed Mobility supine-sit;sit-supine    Comment (Bed Mobility) Min A   Transfers   Transfers shower transfer;toilet transfer;sit-stand transfer   Transfer Comments SBA/CGA   Balance   Balance Assessment no deficits identified   Activities of Daily Living   BADL Assessment/Intervention lower body dressing;toileting   Lower Body Dressing Assessment/Training   [place] Level (Lower Body Dressing) lower body dressing skills;moderate assist (50% patient effort)   Toileting   [place] Level (Toileting) toileting skills;adjust/manage clothing;minimum assist (75% patient effort)   Clinical Impression   Criteria for Skilled Therapeutic Interventions Met (OT) Yes, treatment indicated   OT Diagnosis Decreased ADLs   Influenced by the following impairments R JANNIE   OT Problem List-Impairments impacting ADL activity tolerance impaired;post-surgical precautions;pain   Assessment of Occupational Performance 1-3 Performance Deficits   Identified Performance Deficits LB dressing, bed mobility, tub shower transfer   Planned Therapy Interventions (OT) ADL retraining;bed mobility training;transfer training   Clinical Decision Making Complexity (OT) problem focused assessment/low complexity   Risk & Benefits of therapy have been explained evaluation/treatment results reviewed;patient   OT Total Evaluation Time   OT Eval, Low Complexity Minutes (68721) 10   Therapy Certification   Medical Diagnosis JANNIE   Start of Care Date 07/12/24   Certification date from 07/12/24   Certification date to 08/12/24   OT Goals   Therapy Frequency (OT) One time eval and treatment   OT Predicted Duration/Target Date for Goal Attainment 07/12/24   OT Goals Lower Body Dressing;Bed Mobility;Transfers   OT: Lower Body Dressing Supervision/stand-by assist;using adaptive equipment;within precautions;Goal Met   OT: Bed Mobility Supervision/stand-by assist;supine to/from sitting;within precautions  (w/ leg )   OT: Transfer Supervision/stand-by assist;within precautions;Goal Met    Interventions   Interventions Quick Adds Self-Care/Home Management   Self-Care/Home Management   Self-Care/Home Mgmt/ADL, Compensatory, Meal Prep Minutes (92469) 27   Symptoms Noted During/After Treatment (Meal Preparation/Planning Training) increased pain   Treatment Detail/Skilled Intervention Pt in bed upon arrival and agreeable to therapy. Pt taught back hip precaution of add past midline - reeducated on int rotation and pt verbalized understading. Pt educated on sleep/sex positioning, car transfer safety - pt verbalized understanding. Pt instructed on sit>supine - completed w/ SBA. VC throughout session to push up from surface for STS and maintaining hip precautions. STS throughout session w/ SBA. Pt amb 15 ft x 2 w/ FWW and SBA. Pt educated on LB dressing w/ sock aid and leg  - completed w/ SBA. Pt instructed on toilet transfer and tub shower transfer - completed w/ SBA. Pt educated on leg  for sit<>supine - completed w/ SBA. Pt has plan to get sock aid and leg  for home. Pt ended session in bed w/ all needs w/in reach.   OT Discharge Planning   OT Plan D/C OT   OT Discharge Recommendation (DC Rec) other (see comments)  (defer to ortho)   OT Rationale for DC Rec Pt demo'd safety and ind w/ ADLs, transfers, and bed mobility. Pt has good home set up w/ all necessary AE and support from family.   OT Brief overview of current status SBA transfers, LB dressing, bed mobility   OT Equipment Needed at Discharge   (leg , sock aid - order on [employer])   Total Session Time   Timed Code Treatment Minutes 27   Total Session Time (sum of timed and untimed services) 37    [facility]  OUTPATIENT OCCUPATIONAL THERAPY  EVALUATION  PLAN OF TREATMENT FOR OUTPATIENT REHABILITATION  (COMPLETE FOR INITIAL CLAIMS ONLY)  Patient's Last Name, First Name, M.I.  YOB: 1955  Germán Villanueva                          Provider's Name  [facility] Medical Record No.  [id number]                             Onset Date:  07/11/24   Start of Care Date:  07/12/24   Type:     ___PT   _X_OT   ___SLP Medical Diagnosis:  JANNIE                    OT Diagnosis:  Decreased ADLs Visits from SOC:  1     See note for plan of treatment, functional goals and certification details    I CERTIFY THE NEED FOR THESE SERVICES FURNISHED UNDER        THIS PLAN OF TREATMENT AND WHILE UNDER MY CARE     (Physician co-signature of this document indicates review and certification of the therapy plan).

## 2024-07-12 NOTE — PLAN OF CARE
Physical Therapy Discharge Summary    Reason for therapy discharge:    All goals and outcomes met, no further needs identified.    Progress towards therapy goal(s). See goals on Care Plan in [facility] electronic health record for goal details.  Goals met    Therapy recommendation(s):    Continued therapy is recommended.  Rationale/Recommendations:  continued PT to improve functional mobility.  Continue home exercise program.

## 2024-07-12 NOTE — PROGRESS NOTES
Pt home cpap is setup by RT. Filled with sterile water and plugged into red outlet. Pt  demonstrated the ability to use her cpap. Pt currently on RA talking with family member. RT will continue to monitor pt.

## 2024-07-12 NOTE — SIGNIFICANT EVENT
Significant Event Note - Remote coverage hospitalist MD    Time of event: 10:22 PM July 11, 2024    Description of event:  Paged: PT takes gabapentin at home three times a day, nothing has been ordered could please order it . she takes 1200mg     Plan:  -Ordered 1200mg at bedtime gabapentin   -Day hospitalist to re-evaluate high dose PTA gabapentin and order as indicated in the AM    Discussed with: bedside nurse    Ibrahima Agarwal MD

## 2024-07-12 NOTE — PROGRESS NOTES
Patient discharged with family to transport to home. VSS on RA. PIV access removed prior to discharge. Belongings remain with pt at discharge. AVS reviewed with pt, questions answered, education complete.

## 2024-07-12 NOTE — DISCHARGE SUMMARY
[facility] Orthopedics Discharge Summary                                  [facility]     SHWETA CHAVARRIA [id number]   Age: 68 year old  PCP: Nancy Garcia, [phone] 1955     Date of Admission:  7/11/2024  Date of Discharge::  7/12/2024  Discharge Provider:  PARISH Naqvi CNP    Code status:  Full Code    Admission Information:  Admission Diagnosis:  Osteoarthritis of right hip [M16.11]  Right hip pain [M25.551]    Post-Operative Day: 1 Day Post-Op     Reason for admission:  The patient was admitted for the following:Procedure(s) (LRB):  RIGHT TOTAL HIP ARTHROPLASTY (Right)    Active Problems:    Primary osteoarthritis of right hip      Allergies:  Nickel    Following the procedure noted above the patient was transferred to the post-op floor and started on:    Therapy:  physical therapy and occupational therapy  Anticoagulation Plan: Aspirin 81 mg BID  for 40 days  Pain Management: scopainmedication: oxycodone, tylenol, vistaril, Robaxin and PTA  Gabapentin, Declines Nsaids  Weight bearing status: Weight bearing as tolerated     The patient was followed by Orthopedics during the inpatient treatment course:  Complications:  None  Additional consultations:  Hospitalist      Pertinent Labs   Lab Results: personally reviewed.     Recent Labs   Lab Test 07/12/24  0556 08/14/21  0559   HGB 10.5* 11.6*          Discharge Information:  Condition at discharge: Stable  Discharge destination:  Discharged to home     Medications at discharge:  Current Discharge Medication List        START taking these medications    Details   hydrOXYzine HCl (ATARAX) 10 MG tablet Take 1 tablet (10 mg) by mouth every 6 hours as needed for other (adjuvant pain)  Qty: 25 tablet, Refills: 0    Associated Diagnoses: Primary osteoarthritis of right hip      methocarbamol (ROBAXIN) 500 MG tablet Take 1 tablet (500 mg) by mouth 3 times daily as needed for muscle spasms  Qty: 30 tablet, Refills: 0    Associated  Diagnoses: Primary osteoarthritis of right hip      senna-docusate (SENOKOT-S/PERICOLACE) 8.6-50 MG tablet Take 1 tablet by mouth 2 times daily  Qty: 30 tablet, Refills: 0    Associated Diagnoses: Primary osteoarthritis of right hip           CONTINUE these medications which have CHANGED    Details   aspirin 81 MG EC tablet Take 1 tablet (81 mg) by mouth 2 times daily for 40 days  Qty: 80 tablet, Refills: 0    Associated Diagnoses: Primary osteoarthritis of right hip      oxyCODONE (ROXICODONE) 5 MG tablet Take 1 tablet (5 mg) by mouth 2 times daily as needed for severe pain  Qty: 20 tablet, Refills: 0    Associated Diagnoses: Primary osteoarthritis of right hip           CONTINUE these medications which have NOT CHANGED    Details   acetaminophen (TYLENOL) 325 MG tablet Take 325-650 mg by mouth every 4 hours as needed for mild pain      amLODIPine (NORVASC) 2.5 MG tablet Take 2.5 mg by mouth daily      atorvastatin (LIPITOR) 40 MG tablet Take 40 mg by mouth daily (with dinner)      calcium carbonate-vitamin D (OSCAL W/D) 500-200 MG-UNIT tablet Take 1 tablet by mouth every evening      cinnamon 500 MG CAPS Take 1 capsule by mouth daily      Dulaglutide (TRULICITY) 4.5 MG/0.5ML SOPN Inject 4.5 mg subcutaneously once a week Sundays      furosemide (LASIX) 20 MG tablet Take 20 mg by mouth daily      !! gabapentin (NEURONTIN) 600 MG tablet Take 900 mg by mouth 2 times daily Morning and Evening, 1200 mg at bedtime      !! gabapentin (NEURONTIN) 600 MG tablet Take 1,200 mg by mouth at bedtime      HERBALS Take 1 each by mouth daily Beet supplement  Stopping 7/4/24 before surgery      ketoconazole (NIZORAL) 2 % external shampoo Apply topically daily as needed for itching or irritation      lisinopril (ZESTRIL) 40 MG tablet Take 40 mg by mouth At Bedtime       loperamide (IMODIUM) 2 MG capsule Take 2 mg by mouth 2 times daily      magnesium 250 MG tablet Take 1 tablet by mouth at bedtime      metFORMIN (GLUCOPHAGE XR) 500  MG 24 hr tablet Take 2,000 mg by mouth daily (with dinner)      metoprolol tartrate (LOPRESSOR) 50 MG tablet Take 100 mg by mouth 2 times daily       multivitamin (CENTRUM SILVER) tablet Take 1 tablet by mouth daily      omeprazole (PRILOSEC) 40 MG DR capsule Take 40 mg by mouth daily (with dinner)      potassium 99 MG TABS Take 1 tablet by mouth every evening      triamcinolone (KENALOG) 0.1 % external cream Apply topically 2 times daily as needed for irritation       !! - Potential duplicate medications found. Please discuss with provider.                     Follow-Up Care:  Patient should be seen in the office in 14 days by the Orthopedic Surgeon/Physician Assistant.  Call [phone] for appointment or questions.    It was my pleasure to take care of the above patient.  PARISH Naqvi CNP

## 2024-07-12 NOTE — PROGRESS NOTES
"[facility] MEDICINE PROGRESS NOTE      Identification/Summary: Germán Villanueva is a 68 year old female   PMHx: CVA, type 2 diabetes, HTN, BRONWYN    Hospital Course   7/11/24 - Underwent right JANNIE with Dr. Rayray BARTON, no complications.  [facility] seeing as consult.    Assessment & Plan   Normocytic anemia: mild drop from pre-op.  Hx of CVA: statin and asa per primary  T2DM: no need to discharge on insulin. Resumed metformin  Hypertension: improving now. Home med rec completed  Post-op right JANNIE: no acute concerns from [facility] standpoint       Discharge: expect today, no opposition from [facility] standpoint.  Medically Ready for Discharge: Anticipated Today  DVT Prophylaxis:  Defer to primary service  Code Status: Full Code  Diet: Advance Diet as Tolerated: Regular Diet Adult; Moderate Consistent Carb (60 g CHO per Meal) Diet  Cardiac monitor: None  Body mass index is 33.45 kg/m .    Interval History  Somewhat hypertensive overnight send this morning, otherwise afebrile, stable on room air  Hemoglobin 10.5, a.m. glucose 143  Utilizing aspirin, oxycodone, Tylenol  Received 2 units sliding  Feels her leg is \"longer\"  Tolerating PO. Voiding. Hasn't passed flatus  No cp or sob, n/v, or new neuro symptoms  Had slight lightheadedness with standing.    Lying in bed awake, watching TV  Appropriate in conversation        Last 24H PRN:     HYDROmorphone (DILAUDID) injection 0.2 mg **OR** HYDROmorphone (DILAUDID) injection 0.4 mg, 0.4 mg at 07/12/24 0316    hydrOXYzine HCl (ATARAX) tablet 10 mg, 10 mg at 07/12/24 0316    methocarbamol (ROBAXIN) tablet 500 mg, 500 mg at 07/11/24 1710    oxyCODONE (ROXICODONE) tablet 5 mg, 5 mg at 07/12/24 0435 **OR** oxyCODONE (ROXICODONE) tablet 10 mg, 10 mg at 07/12/24 0025  Wt Readings from Last 5 Encounters:   07/11/24 91.2 kg (201 lb)   08/13/21 99.2 kg (218 lb 9.6 oz)     Avelino Jay MD, MPH  Hospitalist,Hospital Medicine  [facility]: [facility]  Phone: " #[phone]    Medications:   Personally Reviewed.  Medications   Current Facility-Administered Medications   Medication Dose Route Frequency Provider Last Rate Last Admin    lactated ringers infusion   Intravenous Continuous Slava Seo MD   Stopped at 07/12/24 0438     Current Facility-Administered Medications   Medication Dose Route Frequency Provider Last Rate Last Admin    acetaminophen (TYLENOL) tablet 975 mg  975 mg Oral Q8H Slava Seo MD   975 mg at 07/12/24 0258    amLODIPine (NORVASC) tablet 2.5 mg  2.5 mg Oral Daily Avelino Jay MD        aspirin EC tablet 81 mg  81 mg Oral BID Slava Seo MD   81 mg at 07/11/24 2203    atorvastatin (LIPITOR) tablet 40 mg  40 mg Oral Daily with supper Avelino Jay MD   40 mg at 07/11/24 1711    furosemide (LASIX) tablet 20 mg  20 mg Oral Daily Avelino Jay MD        gabapentin (NEURONTIN) capsule 1,200 mg  1,200 mg Oral At Bedtime Ibrahima Agarwal MD   1,200 mg at 07/11/24 2229    insulin aspart (NovoLOG) injection (RAPID ACTING)  1-3 Units Subcutaneous TID AC Avelino Jay MD   1 Units at 07/12/24 0635    insulin aspart (NovoLOG) injection (RAPID ACTING)  1-3 Units Subcutaneous At Bedtime Avelino Jay MD        ketorolac (TORADOL) injection 15 mg  15 mg Intravenous Q6H Slava Seo MD   15 mg at 07/12/24 0631    lisinopril (ZESTRIL) tablet 40 mg  40 mg Oral At Bedtime Avelino Jay MD   40 mg at 07/11/24 2202    [Held by provider] loperamide (IMODIUM) capsule 2 mg  2 mg Oral BID Avelino Jay MD        magnesium oxide (MAG-OX) half-tab 200 mg  200 mg Oral At Bedtime Slava eSo MD   200 mg at 07/11/24 2202    metFORMIN (GLUCOPHAGE XR) 24 hr tablet 2,000 mg  2,000 mg Oral Daily with supper Avelino Jay MD   2,000 mg at 07/11/24 1711    metoprolol tartrate (LOPRESSOR) tablet 100 mg  100 mg Oral BID Avelino Jay MD   100 mg at 07/11/24 2203    pantoprazole (PROTONIX) EC tablet 40 mg  40 mg  "Oral Daily with supper Slava Seo MD   40 mg at 07/11/24 1711    polyethylene glycol (MIRALAX) Packet 17 g  17 g Oral Daily Slava Seo MD        potassium gluconate tablet 2.5 mEq  2.5 mEq Oral QPM Slava Seo MD   2.5 mEq at 07/11/24 2203    senna-docusate (SENOKOT-S/PERICOLACE) 8.6-50 MG per tablet 1 tablet  1 tablet Oral BID Slava Seo MD   1 tablet at 07/11/24 2202    sodium chloride (PF) 0.9% PF flush 3 mL  3 mL Intracatheter Q8H Slava Seo MD   3 mL at 07/12/24 0632     Clinically Significant Risk Factors Present on Admission                # Drug Induced Platelet Defect: home medication list includes an antiplatelet medication   # Hypertension: Home medication list includes antihypertensive(s)    # Anemia: based on hgb <11           # Obesity: Estimated body mass index is 33.45 kg/m  as calculated from the following:    Height as of this encounter: 1.651 m (5' 5\").    Weight as of this encounter: 91.2 kg (201 lb).              "

## 2024-07-12 NOTE — PLAN OF CARE
Problem: Adult Inpatient Plan of Care  Goal: Absence of Hospital-Acquired Illness or Injury  Intervention: Identify and Manage Fall Risk  Recent Flowsheet Documentation  Taken 7/11/2024 1830 by Halina Celaya RN  Safety Promotion/Fall Prevention: safety round/check completed  Taken 7/11/2024 1630 by Halina Celaya RN  Safety Promotion/Fall Prevention: safety round/check completed   Goal Outcome Evaluation:      Plan of Care Reviewed With: patient    Overall Patient Progress: improvingOverall Patient Progress: improving  Pt alert and oriented, vss on room air, can voice needs, ambulated and voided, potential discharge tomorrow. Pain under control.

## 2024-07-12 NOTE — PROGRESS NOTES
"[facility] Orthopaedics Progress Note      Post-operative Day: 1 Day Post-Op    Procedure(s):  RIGHT TOTAL HIP ARTHROPLASTY    Subjective: Patient seen up resting in bedside chair.  Endorses pain to right hip but tolerable on p.o. analgesics.  Tolerating regular diet with no nausea or vomiting.  Voiding without difficulty and passing gas.    Pain: moderate  Chest pain, SOB:  No    Objective:  Blood pressure (!) 158/76, pulse 68, temperature 98.8  F (37.1  C), temperature source Oral, resp. rate 16, height 1.651 m (5' 5\"), weight 91.2 kg (201 lb), SpO2 92%, not currently breastfeeding.    Patient Vitals for the past 24 hrs:   BP Temp Temp src Pulse Resp SpO2   07/12/24 1009 (!) 158/76 -- -- -- -- --   07/12/24 0806 (!) 143/67 98.8  F (37.1  C) Oral 68 16 92 %   07/12/24 0259 (!) 171/83 98.4  F (36.9  C) Oral -- 18 94 %   07/11/24 2130 (!) 140/69 98.6  F (37  C) Axillary 65 18 94 %   07/11/24 2000 -- -- -- -- -- 92 %   07/11/24 1730 (!) 157/75 97.1  F (36.2  C) Oral 63 18 92 %   07/11/24 1630 135/76 97.6  F (36.4  C) Oral 68 16 96 %   07/11/24 1530 (!) 166/81 97.5  F (36.4  C) Oral 68 16 97 %   07/11/24 1454 (!) 163/75 -- -- 67 15 92 %   07/11/24 1430 (!) 144/70 97.2  F (36.2  C) Oral 61 12 95 %   07/11/24 1350 (!) 147/70 -- -- 69 11 95 %   07/11/24 1340 (!) 151/79 -- -- 72 16 94 %   07/11/24 1330 126/67 -- -- 76 26 93 %   07/11/24 1322 134/64 97.1  F (36.2  C) Temporal 74 18 94 %       Wt Readings from Last 4 Encounters:   07/11/24 91.2 kg (201 lb)   08/13/21 99.2 kg (218 lb 9.6 oz)       General: Alert and orientated, NAD  Respiratory: Non-labored breathing on RA  RLE motor function, sensation, and circulation intact   Yes, Dorsiflexion/plantarflexion intact and equal bilaterally. Moves all other extremities with ease and purpose   Wound status: incisions are clean dry and intact. Yes  Calf tenderness: Bilateral  No    Pertinent Labs   Lab Results: personally reviewed.     Recent Labs   Lab Test 07/12/24  0556 " 08/14/21  0559   HGB 10.5* 11.6*       Plan:   Anticoagulation protocol: Aspirin 81 mg BID  x 40  days  Pain medications:  scopainmedication: oxycodone, tylenol, vistaril, and Robaxin, PTA Gabapentin   Weight bearing status:  WBAT, posterior hip precautions   Disposition:  Home today pending clearance from hospitalist and therapies    Continue cares and rehabilitation     Report completed by:  PARISH Naqvi CNP  Date: 7/12/2024  Time: 11:41 AM

## 2024-07-12 NOTE — PROGRESS NOTES
Patient vital signs are at baseline: Yes  Patient able to ambulate as they were prior to admission or with assist devices provided by therapies during their stay:  Yes  Patient MUST void prior to discharge:  Yes  Patient able to tolerate oral intake:  Yes  Pain has adequate pain control using Oral analgesics:  No,  Reason:  IV dilaudid given x1 for breakthrough pain  Does patient have an identified :  Yes  Has goal D/C date and time been discussed with patient:  Yes   Plan to discharge home with  7/12.

## (undated) DEVICE — DRAPE IOBAN INCISE 23X17" 6650EZ

## (undated) DEVICE — GLOVE PROTEXIS POWDER FREE 8.5 ORTHOPEDIC 2D73ET85

## (undated) DEVICE — SU VICRYL 1 CT-1 27" J341H

## (undated) DEVICE — SOL NACL 0.9% IRRIG 1000ML BOTTLE 2F7124

## (undated) DEVICE — PACK TOTAL KNEE BOXED LATEX FREE PO15TKFCT

## (undated) DEVICE — SUTURE VICRYL+ 2-0 27IN CT-1 UND VCP259H

## (undated) DEVICE — GOWN IMPERVIOUS BREATHABLE SMART XLG 89045

## (undated) DEVICE — DRSG AQUACEL AG HYDROFIBER  3.5X10" 422605

## (undated) DEVICE — BLADE SAW SAGITTAL STRK 13X90X1.27MM HD SYS 6 6113-127-090

## (undated) DEVICE — CUSTOM PACK TOTAL HIP SOP5BTHHEA

## (undated) DEVICE — SUCTION MANIFOLD NEPTUNE 2 SYS 4 PORT 0702-020-000

## (undated) DEVICE — BONE CEMENT MIXEVAC III HI VAC KIT  0206-015-000

## (undated) DEVICE — POSITIONER ABDUCTION PILLOW FOAM MED FP-ABDUCTM

## (undated) DEVICE — BONE CLEANING TIP INTERPULSE  0210-010-000

## (undated) DEVICE — LINEN FULL SHEET 5511

## (undated) DEVICE — ELECTRODE PATIENT RETURN ADULT L10 FT 2 PLATE CORD 0855C

## (undated) DEVICE — GLOVE BIOGEL PI SZ 8.0 40880

## (undated) DEVICE — SOL WATER IRRIG 1000ML BOTTLE 2F7114

## (undated) DEVICE — CAST PADDING 6" STERILE 9046S

## (undated) DEVICE — SYR 30ML LL W/O NDL

## (undated) DEVICE — SUTURE VICRYL+ 1 27IN CT-1 UND VCP261H

## (undated) DEVICE — DECANTER VIAL 2006S

## (undated) DEVICE — GLOVE PROTEXIS BLUE W/NEU-THERA 7.0  2D73EB70

## (undated) DEVICE — ESU GROUND PAD ADULT W/CORD E7507

## (undated) DEVICE — HOLDER LIMB VELCRO OR 0814-1533

## (undated) DEVICE — GLOVE BIOGEL PI INDICATOR 8.0 LF 41680

## (undated) DEVICE — DRSG STERI STRIP 1X5" R1548

## (undated) DEVICE — BLADE SAW SAGITTAL STRK 25X90X1.27MM HD SYS 6 6125-127-090

## (undated) DEVICE — SU VICRYL 2-0 CT-1 27" UND J259H

## (undated) DEVICE — SUCTION SLEEVE NEPTUNE 2 165MM 0703-005-165

## (undated) DEVICE — GLOVE PROTEXIS BLUE W/NEU-THERA 8.5  2D73EB85

## (undated) DEVICE — SOL NACL 0.9% IRRIG 3000ML BAG 2B7477

## (undated) DEVICE — LINEN ORTHO ACL PACK 5447

## (undated) DEVICE — BLADE SAGITTAL WIDE (SO-618) 2108-118

## (undated) DEVICE — SUTURE PDS 1 CTB-1 ZB347

## (undated) DEVICE — GLOVE PROTEXIS W/NEU-THERA 7.0  2D73TE70

## (undated) DEVICE — SOLUTION IRRIG 2B7127 .9NS 3000ML BAG

## (undated) DEVICE — GLOVE PROTEXIS W/NEU-THERA 8.5  2D73TE85

## (undated) DEVICE — GLOVE BIOGEL PI SZ 7.0 40870

## (undated) DEVICE — POSITIONER HEAD DONUT FOAM 9" LF FP-HEAD9

## (undated) DEVICE — HOOD SURG T7PLUS PEEL AWAY FACE SHIELD STRL LF 0416-801-100

## (undated) DEVICE — ESU ELEC BLADE 6" COATED E1450-6

## (undated) DEVICE — SUCTION IRR SYSTEM W/O TIP INTERPULSE HANDPIECE 0210-100-000

## (undated) DEVICE — SYR 03ML LL W/O NDL 309657

## (undated) DEVICE — GLOVE BIOGEL INDICATOR 7.5 LF 41675

## (undated) DEVICE — DRSG TEGADERM 2 3/8X2 3/4" 1624W

## (undated) DEVICE — SU ETHIBOND 0 CT-1 CR 8X18" CX21D

## (undated) DEVICE — TOURNIQUET SGL  BLADDER 30"X4" BLUE 5921030135

## (undated) DEVICE — GLOVE PROTEXIS POWDER FREE 7.0 ORTHOPEDIC 2D73ET70

## (undated) DEVICE — SUTURE MONOCRYL 3-0 18 PS2 UND MCP497G

## (undated) DEVICE — SUTURE VICRYL+ 1 18 CT/CR  VLT VCP753D

## (undated) DEVICE — BAG CLEAR TRASH 1.3M 39X33" P4040C

## (undated) DEVICE — SET HANDPIECE INTERPULSE W/COAXIAL FAN SPRAY TIP 0210118000

## (undated) DEVICE — DRSG AQUACEL AG 3.5X9.75" HYDROFIBER 412011

## (undated) DEVICE — NEEDLE HYPO MAGELLAN SAFETY 22GA 1 1/2IN 8881850215

## (undated) RX ORDER — GLYCOPYRROLATE 0.2 MG/ML
INJECTION, SOLUTION INTRAMUSCULAR; INTRAVENOUS
Status: DISPENSED
Start: 2024-07-11

## (undated) RX ORDER — PROPOFOL 10 MG/ML
INJECTION, EMULSION INTRAVENOUS
Status: DISPENSED
Start: 2024-07-11

## (undated) RX ORDER — PROPOFOL 10 MG/ML
INJECTION, EMULSION INTRAVENOUS
Status: DISPENSED
Start: 2021-08-13

## (undated) RX ORDER — ONDANSETRON 2 MG/ML
INJECTION INTRAMUSCULAR; INTRAVENOUS
Status: DISPENSED
Start: 2024-07-11

## (undated) RX ORDER — FENTANYL CITRATE 50 UG/ML
INJECTION, SOLUTION INTRAMUSCULAR; INTRAVENOUS
Status: DISPENSED
Start: 2024-07-11

## (undated) RX ORDER — OXYCODONE HYDROCHLORIDE 5 MG/1
TABLET ORAL
Status: DISPENSED
Start: 2021-08-13

## (undated) RX ORDER — FENTANYL CITRATE 50 UG/ML
INJECTION, SOLUTION INTRAMUSCULAR; INTRAVENOUS
Status: DISPENSED
Start: 2021-08-13

## (undated) RX ORDER — ONDANSETRON 2 MG/ML
INJECTION INTRAMUSCULAR; INTRAVENOUS
Status: DISPENSED
Start: 2021-08-13

## (undated) RX ORDER — TRANEXAMIC ACID 650 MG/1
TABLET ORAL
Status: DISPENSED
Start: 2021-08-13

## (undated) RX ORDER — LABETALOL HYDROCHLORIDE 5 MG/ML
INJECTION, SOLUTION INTRAVENOUS
Status: DISPENSED
Start: 2021-08-13

## (undated) RX ORDER — CEFAZOLIN SODIUM 2 G/100ML
INJECTION, SOLUTION INTRAVENOUS
Status: DISPENSED
Start: 2021-08-13

## (undated) RX ORDER — DEXAMETHASONE SODIUM PHOSPHATE 4 MG/ML
INJECTION, SOLUTION INTRA-ARTICULAR; INTRALESIONAL; INTRAMUSCULAR; INTRAVENOUS; SOFT TISSUE
Status: DISPENSED
Start: 2021-08-13

## (undated) RX ORDER — NALOXONE HYDROCHLORIDE 0.4 MG/ML
INJECTION, SOLUTION INTRAMUSCULAR; INTRAVENOUS; SUBCUTANEOUS
Status: DISPENSED
Start: 2021-08-13

## (undated) RX ORDER — ACETAMINOPHEN 325 MG/1
TABLET ORAL
Status: DISPENSED
Start: 2021-08-13

## (undated) RX ORDER — HYDROMORPHONE HCL IN WATER/PF 6 MG/30 ML
PATIENT CONTROLLED ANALGESIA SYRINGE INTRAVENOUS
Status: DISPENSED
Start: 2021-08-13

## (undated) RX ORDER — LIDOCAINE HYDROCHLORIDE 10 MG/ML
INJECTION, SOLUTION EPIDURAL; INFILTRATION; INTRACAUDAL; PERINEURAL
Status: DISPENSED
Start: 2021-08-13

## (undated) RX ORDER — ATROPINE SULFATE 0.4 MG/ML
AMPUL (ML) INJECTION
Status: DISPENSED
Start: 2021-08-13